# Patient Record
Sex: FEMALE | Race: WHITE | NOT HISPANIC OR LATINO | Employment: UNEMPLOYED | URBAN - METROPOLITAN AREA
[De-identification: names, ages, dates, MRNs, and addresses within clinical notes are randomized per-mention and may not be internally consistent; named-entity substitution may affect disease eponyms.]

---

## 2023-08-29 LAB
BASOPHILS (10*3/UL) IN BLOOD BY AUTOMATED COUNT: 0.05 X10E9/L (ref 0–0.1)
BASOPHILS/100 LEUKOCYTES IN BLOOD BY AUTOMATED COUNT: 0.5 % (ref 0–2)
EOSINOPHILS (10*3/UL) IN BLOOD BY AUTOMATED COUNT: 0.14 X10E9/L (ref 0–0.7)
EOSINOPHILS/100 LEUKOCYTES IN BLOOD BY AUTOMATED COUNT: 1.3 % (ref 0–6)
ERYTHROCYTE DISTRIBUTION WIDTH (RATIO) BY AUTOMATED COUNT: 15 % (ref 11.5–14.5)
ERYTHROCYTE MEAN CORPUSCULAR HEMOGLOBIN CONCENTRATION (G/DL) BY AUTOMATED: 32.1 G/DL (ref 32–36)
ERYTHROCYTE MEAN CORPUSCULAR VOLUME (FL) BY AUTOMATED COUNT: 83 FL (ref 80–100)
ERYTHROCYTES (10*6/UL) IN BLOOD BY AUTOMATED COUNT: 4.8 X10E12/L (ref 4–5.2)
HEMATOCRIT (%) IN BLOOD BY AUTOMATED COUNT: 39.9 % (ref 36–46)
HEMOGLOBIN (G/DL) IN BLOOD: 12.8 G/DL (ref 12–16)
IMMATURE GRANULOCYTES/100 LEUKOCYTES IN BLOOD BY AUTOMATED COUNT: 0.4 % (ref 0–0.9)
LEUKOCYTES (10*3/UL) IN BLOOD BY AUTOMATED COUNT: 10.9 X10E9/L (ref 4.4–11.3)
LYMPHOCYTES (10*3/UL) IN BLOOD BY AUTOMATED COUNT: 2.11 X10E9/L (ref 1.2–4.8)
LYMPHOCYTES/100 LEUKOCYTES IN BLOOD BY AUTOMATED COUNT: 19.3 % (ref 13–44)
MONOCYTES (10*3/UL) IN BLOOD BY AUTOMATED COUNT: 0.67 X10E9/L (ref 0.1–1)
MONOCYTES/100 LEUKOCYTES IN BLOOD BY AUTOMATED COUNT: 6.1 % (ref 2–10)
NEUTROPHILS (10*3/UL) IN BLOOD BY AUTOMATED COUNT: 7.92 X10E9/L (ref 1.2–7.7)
NEUTROPHILS/100 LEUKOCYTES IN BLOOD BY AUTOMATED COUNT: 72.4 % (ref 40–80)
PLATELETS (10*3/UL) IN BLOOD AUTOMATED COUNT: 276 X10E9/L (ref 150–450)

## 2023-10-02 ENCOUNTER — SPECIALTY PHARMACY (OUTPATIENT)
Dept: PHARMACY | Facility: CLINIC | Age: 63
End: 2023-10-02

## 2023-10-03 ENCOUNTER — PHARMACY VISIT (OUTPATIENT)
Dept: PHARMACY | Facility: CLINIC | Age: 63
End: 2023-10-03
Payer: MEDICAID

## 2023-10-03 PROCEDURE — RXMED WILLOW AMBULATORY MEDICATION CHARGE

## 2023-10-06 PROBLEM — R73.09 ELEVATED GLUCOSE LEVEL: Status: ACTIVE | Noted: 2023-10-06

## 2023-10-06 PROBLEM — I10 HYPERTENSION: Status: ACTIVE | Noted: 2023-10-06

## 2023-10-06 PROBLEM — E78.5 HYPERLIPIDEMIA, UNSPECIFIED: Status: ACTIVE | Noted: 2023-10-06

## 2023-10-06 PROBLEM — R42 VERTIGO: Status: ACTIVE | Noted: 2023-10-06

## 2023-10-06 PROBLEM — G93.89 MASS OF BRAIN: Status: ACTIVE | Noted: 2023-10-06

## 2023-10-06 PROBLEM — R04.0 EPISTAXIS: Status: ACTIVE | Noted: 2023-10-06

## 2023-10-06 PROBLEM — E11.9 DM TYPE 2 (DIABETES MELLITUS, TYPE 2) (MULTI): Status: ACTIVE | Noted: 2023-10-06

## 2023-10-06 PROBLEM — D32.9 MENINGIOMA (MULTI): Status: ACTIVE | Noted: 2023-10-06

## 2023-10-06 PROBLEM — M17.12 PRIMARY OSTEOARTHRITIS OF LEFT KNEE: Status: ACTIVE | Noted: 2023-10-06

## 2023-10-06 PROBLEM — S39.012A BACK STRAIN: Status: ACTIVE | Noted: 2023-10-06

## 2023-10-06 PROBLEM — D49.6 BRAIN TUMOR (MULTI): Status: ACTIVE | Noted: 2023-10-06

## 2023-10-06 PROBLEM — L03.211 CELLULITIS OF FACE: Status: ACTIVE | Noted: 2023-10-06

## 2023-10-06 PROBLEM — N32.81 OVERACTIVE BLADDER: Status: ACTIVE | Noted: 2023-10-06

## 2023-10-06 PROBLEM — M25.562 LEFT KNEE PAIN: Status: ACTIVE | Noted: 2023-10-06

## 2023-10-06 PROBLEM — D69.3 THROMBOCYTOPENIA, IDIOPATHIC (MULTI): Status: ACTIVE | Noted: 2023-10-06

## 2023-10-06 PROBLEM — R52 PAIN: Status: ACTIVE | Noted: 2023-10-06

## 2023-10-06 PROBLEM — E66.01 MORBID OBESITY (MULTI): Status: ACTIVE | Noted: 2023-10-06

## 2023-10-06 PROBLEM — M17.11 PRIMARY OSTEOARTHRITIS OF RIGHT KNEE: Status: ACTIVE | Noted: 2023-10-06

## 2023-10-06 PROBLEM — J32.9 SINUSITIS: Status: ACTIVE | Noted: 2023-10-06

## 2023-10-06 PROBLEM — M17.9 OA (OSTEOARTHRITIS) OF KNEE: Status: ACTIVE | Noted: 2023-10-06

## 2023-10-06 RX ORDER — TIZANIDINE 4 MG/1
1 TABLET ORAL 3 TIMES DAILY PRN
COMMUNITY
Start: 2022-10-19 | End: 2024-02-15 | Stop reason: ALTCHOICE

## 2023-10-06 RX ORDER — AMOXICILLIN 250 MG
CAPSULE ORAL
COMMUNITY
Start: 2021-04-29 | End: 2024-02-15 | Stop reason: ALTCHOICE

## 2023-10-06 RX ORDER — METFORMIN HYDROCHLORIDE 500 MG/1
1 TABLET ORAL 2 TIMES DAILY
COMMUNITY
Start: 2021-11-05 | End: 2023-10-10 | Stop reason: SDUPTHER

## 2023-10-06 RX ORDER — IBUPROFEN 200 MG
CAPSULE ORAL 2 TIMES DAILY
COMMUNITY
Start: 2021-11-12

## 2023-10-06 RX ORDER — INSULIN GLARGINE 100 [IU]/ML
52 INJECTION, SOLUTION SUBCUTANEOUS NIGHTLY
COMMUNITY
Start: 2021-12-10 | End: 2023-10-10 | Stop reason: SDUPTHER

## 2023-10-06 RX ORDER — METOPROLOL TARTRATE 50 MG/1
1 TABLET ORAL 2 TIMES DAILY
COMMUNITY
Start: 2020-06-29 | End: 2023-10-10 | Stop reason: SDUPTHER

## 2023-10-06 RX ORDER — MELOXICAM 15 MG/1
1 TABLET ORAL DAILY
COMMUNITY
Start: 2022-11-28 | End: 2023-10-10 | Stop reason: SDUPTHER

## 2023-10-06 RX ORDER — IBUPROFEN 600 MG/1
600 TABLET ORAL 3 TIMES DAILY PRN
COMMUNITY
Start: 2022-10-19 | End: 2023-10-30 | Stop reason: WASHOUT

## 2023-10-06 RX ORDER — PREDNISONE 5 MG/1
5 TABLET ORAL NIGHTLY
COMMUNITY
Start: 2021-10-28 | End: 2023-10-30 | Stop reason: ALTCHOICE

## 2023-10-06 RX ORDER — LOVASTATIN 40 MG/1
1 TABLET ORAL DAILY
COMMUNITY
Start: 2021-07-20 | End: 2023-10-10 | Stop reason: SDUPTHER

## 2023-10-10 ENCOUNTER — OFFICE VISIT (OUTPATIENT)
Dept: PRIMARY CARE | Facility: CLINIC | Age: 63
End: 2023-10-10
Payer: COMMERCIAL

## 2023-10-10 ENCOUNTER — LAB (OUTPATIENT)
Dept: LAB | Facility: LAB | Age: 63
End: 2023-10-10
Payer: COMMERCIAL

## 2023-10-10 VITALS
HEART RATE: 66 BPM | DIASTOLIC BLOOD PRESSURE: 80 MMHG | SYSTOLIC BLOOD PRESSURE: 132 MMHG | BODY MASS INDEX: 45.2 KG/M2 | WEIGHT: 288 LBS | HEIGHT: 67 IN | TEMPERATURE: 97.9 F | OXYGEN SATURATION: 96 %

## 2023-10-10 DIAGNOSIS — M17.12 PRIMARY OSTEOARTHRITIS OF LEFT KNEE: ICD-10-CM

## 2023-10-10 DIAGNOSIS — E78.5 HYPERLIPIDEMIA, UNSPECIFIED HYPERLIPIDEMIA TYPE: ICD-10-CM

## 2023-10-10 DIAGNOSIS — Z79.4 TYPE 2 DIABETES MELLITUS WITHOUT COMPLICATION, WITH LONG-TERM CURRENT USE OF INSULIN (MULTI): ICD-10-CM

## 2023-10-10 DIAGNOSIS — I10 HYPERTENSION, UNSPECIFIED TYPE: ICD-10-CM

## 2023-10-10 DIAGNOSIS — E11.9 TYPE 2 DIABETES MELLITUS WITHOUT COMPLICATION, WITH LONG-TERM CURRENT USE OF INSULIN (MULTI): ICD-10-CM

## 2023-10-10 LAB
ALBUMIN SERPL BCP-MCNC: 4.3 G/DL (ref 3.4–5)
ALP SERPL-CCNC: 97 U/L (ref 33–136)
ALT SERPL W P-5'-P-CCNC: 10 U/L (ref 7–45)
ANION GAP SERPL CALC-SCNC: 13 MMOL/L (ref 10–20)
AST SERPL W P-5'-P-CCNC: 11 U/L (ref 9–39)
BILIRUB SERPL-MCNC: 1 MG/DL (ref 0–1.2)
BUN SERPL-MCNC: 15 MG/DL (ref 6–23)
CALCIUM SERPL-MCNC: 9.3 MG/DL (ref 8.6–10.3)
CHLORIDE SERPL-SCNC: 103 MMOL/L (ref 98–107)
CHOLEST SERPL-MCNC: 138 MG/DL (ref 0–199)
CHOLESTEROL/HDL RATIO: 2.5
CO2 SERPL-SCNC: 29 MMOL/L (ref 21–32)
CREAT SERPL-MCNC: 0.74 MG/DL (ref 0.5–1.05)
GFR SERPL CREATININE-BSD FRML MDRD: 90 ML/MIN/1.73M*2
GLUCOSE SERPL-MCNC: 120 MG/DL (ref 74–99)
HDLC SERPL-MCNC: 54.8 MG/DL
LDLC SERPL CALC-MCNC: 59 MG/DL (ref 140–190)
NON HDL CHOLESTEROL: 83 MG/DL (ref 0–149)
POTASSIUM SERPL-SCNC: 4.6 MMOL/L (ref 3.5–5.3)
PROT SERPL-MCNC: 7.2 G/DL (ref 6.4–8.2)
SODIUM SERPL-SCNC: 140 MMOL/L (ref 136–145)
TRIGL SERPL-MCNC: 122 MG/DL (ref 0–149)
VLDL: 24 MG/DL (ref 0–40)

## 2023-10-10 PROCEDURE — 80061 LIPID PANEL: CPT

## 2023-10-10 PROCEDURE — 3044F HG A1C LEVEL LT 7.0%: CPT | Performed by: FAMILY MEDICINE

## 2023-10-10 PROCEDURE — 80053 COMPREHEN METABOLIC PANEL: CPT

## 2023-10-10 PROCEDURE — 3079F DIAST BP 80-89 MM HG: CPT | Performed by: FAMILY MEDICINE

## 2023-10-10 PROCEDURE — 36415 COLL VENOUS BLD VENIPUNCTURE: CPT

## 2023-10-10 PROCEDURE — 1036F TOBACCO NON-USER: CPT | Performed by: FAMILY MEDICINE

## 2023-10-10 PROCEDURE — 3048F LDL-C <100 MG/DL: CPT | Performed by: FAMILY MEDICINE

## 2023-10-10 PROCEDURE — 3075F SYST BP GE 130 - 139MM HG: CPT | Performed by: FAMILY MEDICINE

## 2023-10-10 PROCEDURE — 83036 HEMOGLOBIN GLYCOSYLATED A1C: CPT

## 2023-10-10 PROCEDURE — 99214 OFFICE O/P EST MOD 30 MIN: CPT | Performed by: FAMILY MEDICINE

## 2023-10-10 RX ORDER — LOVASTATIN 40 MG/1
40 TABLET ORAL DAILY
Qty: 90 TABLET | Refills: 0 | Status: SHIPPED | OUTPATIENT
Start: 2023-10-10 | End: 2024-02-15 | Stop reason: SDUPTHER

## 2023-10-10 RX ORDER — METOPROLOL TARTRATE 50 MG/1
50 TABLET ORAL 2 TIMES DAILY
Qty: 180 TABLET | Refills: 0 | Status: SHIPPED | OUTPATIENT
Start: 2023-10-10 | End: 2024-02-15 | Stop reason: SDUPTHER

## 2023-10-10 RX ORDER — INSULIN GLARGINE 100 [IU]/ML
52 INJECTION, SOLUTION SUBCUTANEOUS NIGHTLY
Qty: 15.6 ML | Refills: 2 | Status: SHIPPED | OUTPATIENT
Start: 2023-10-10 | End: 2024-02-15 | Stop reason: SDUPTHER

## 2023-10-10 RX ORDER — MELOXICAM 15 MG/1
15 TABLET ORAL DAILY
Qty: 90 TABLET | Refills: 0 | Status: SHIPPED | OUTPATIENT
Start: 2023-10-10 | End: 2023-10-30 | Stop reason: SDUPTHER

## 2023-10-10 RX ORDER — METFORMIN HYDROCHLORIDE 500 MG/1
500 TABLET ORAL 2 TIMES DAILY
Qty: 180 TABLET | Refills: 0 | Status: SHIPPED | OUTPATIENT
Start: 2023-10-10 | End: 2024-01-30

## 2023-10-10 NOTE — PROGRESS NOTES
"Subjective   Patient ID: Merle Dickerson is a 62 y.o. female who presents for Med Refill. Is fasting for labs.     HPI Pt is here for refills of medications to treat the following medical conditions. Unless otherwise stated, these conditions are well-controlled, pt is taking medications s Rx, and there are no reported side effects to medications or other treatment: DM2, Hyperlipidemia, Hypertension, Primary Osteoarthritis of left knee. Pt does check her sugars at home -- have been around 120-138 in the morning, rarely over 140. She goes between 42 and 44 units at bedtime. Has not had to go higher.     Review of Systems   All other systems reviewed and are negative.      Objective   /81   Pulse 66   Temp 36.6 °C (97.9 °F)   Ht 1.689 m (5' 6.5\")   Wt 131 kg (288 lb)   SpO2 96%   BMI 45.79 kg/m²     Physical Exam  Constitutional:       General: She is awake.      Appearance: Normal appearance. She is well-developed.   HENT:      Head: Normocephalic and atraumatic.   Cardiovascular:      Rate and Rhythm: Normal rate.   Pulmonary:      Effort: Pulmonary effort is normal.   Musculoskeletal:      Cervical back: Normal range of motion and neck supple.      Right lower leg: No edema.      Left lower leg: No edema.   Skin:     General: Skin is warm and dry.      Findings: No lesion or rash.   Neurological:      General: No focal deficit present.      Mental Status: She is alert and oriented to person, place, and time.      Cranial Nerves: No facial asymmetry.      Motor: Motor function is intact.      Gait: Gait is intact.   Psychiatric:         Attention and Perception: Attention normal.         Mood and Affect: Mood and affect normal.         Assessment/Plan   Problem List Items Addressed This Visit             ICD-10-CM       Cardiac and Vasculature    Hypertension I10    Relevant Medications    metoprolol tartrate (Lopressor) 50 mg tablet    Hyperlipidemia, unspecified E78.5    Relevant Medications    lovastatin " (Mevacor) 40 mg tablet    Other Relevant Orders    Lipid panel       Endocrine/Metabolic    DM type 2 (diabetes mellitus, type 2) (CMS/[facility]) E11.9    Relevant Medications    metFORMIN (Glucophage) 500 mg tablet    insulin glargine (Lantus Solostar U-100 Insulin) 100 unit/mL (3 mL) pen    Other Relevant Orders    Comprehensive metabolic panel    Hemoglobin A1c       Musculoskeletal and Injuries    Primary osteoarthritis of left knee M17.12    Relevant Medications    meloxicam (Mobic) 15 mg tablet        Chronic medications refilled without changes x 3 months. RTC at that time for refills.

## 2023-10-11 LAB
EST. AVERAGE GLUCOSE BLD GHB EST-MCNC: 157 MG/DL
HBA1C MFR BLD: 7.1 %

## 2023-10-30 ENCOUNTER — OFFICE VISIT (OUTPATIENT)
Dept: PRIMARY CARE | Facility: CLINIC | Age: 63
End: 2023-10-30
Payer: COMMERCIAL

## 2023-10-30 VITALS
BODY MASS INDEX: 45.99 KG/M2 | OXYGEN SATURATION: 95 % | HEART RATE: 83 BPM | HEIGHT: 67 IN | TEMPERATURE: 97.8 F | DIASTOLIC BLOOD PRESSURE: 80 MMHG | WEIGHT: 293 LBS | SYSTOLIC BLOOD PRESSURE: 132 MMHG

## 2023-10-30 DIAGNOSIS — M75.50 SUBSCAPULAR BURSITIS: Primary | ICD-10-CM

## 2023-10-30 DIAGNOSIS — M17.12 PRIMARY OSTEOARTHRITIS OF LEFT KNEE: ICD-10-CM

## 2023-10-30 PROCEDURE — 3075F SYST BP GE 130 - 139MM HG: CPT | Performed by: FAMILY MEDICINE

## 2023-10-30 PROCEDURE — 3079F DIAST BP 80-89 MM HG: CPT | Performed by: FAMILY MEDICINE

## 2023-10-30 PROCEDURE — 3051F HG A1C>EQUAL 7.0%<8.0%: CPT | Performed by: FAMILY MEDICINE

## 2023-10-30 PROCEDURE — 99214 OFFICE O/P EST MOD 30 MIN: CPT | Performed by: FAMILY MEDICINE

## 2023-10-30 PROCEDURE — 1036F TOBACCO NON-USER: CPT | Performed by: FAMILY MEDICINE

## 2023-10-30 PROCEDURE — 3048F LDL-C <100 MG/DL: CPT | Performed by: FAMILY MEDICINE

## 2023-10-30 RX ORDER — MELOXICAM 15 MG/1
15 TABLET ORAL DAILY
Qty: 90 TABLET | Refills: 0 | Status: SHIPPED | OUTPATIENT
Start: 2023-10-30 | End: 2024-01-28

## 2023-10-30 NOTE — PROGRESS NOTES
"Subjective   Patient ID: Merle Dickerson is a 62 y.o. female who presents for Back Pain (Below her right shoulder blade ).    HPI States this past Thursday she woke up stabbing pain below her right shoulder blade that's radiating into her buttocks. Has tried Tylenol with no relief in pain. Denies any injury but states she has been doing a lot of laundry.     Review of Systems   All other systems reviewed and are negative.      Objective   /80   Pulse 83   Temp 36.6 °C (97.8 °F)   Ht 1.689 m (5' 6.5\")   Wt 133 kg (293 lb 12.8 oz)   SpO2 95%   BMI 46.71 kg/m²     Physical Exam  Constitutional:       Appearance: Normal appearance.   HENT:      Mouth/Throat:      Mouth: Mucous membranes are moist.   Eyes:      Conjunctiva/sclera: Conjunctivae normal.   Cardiovascular:      Rate and Rhythm: Normal rate and regular rhythm.   Pulmonary:      Effort: Pulmonary effort is normal.      Breath sounds: Normal breath sounds.   Abdominal:      Palpations: Abdomen is soft.   Musculoskeletal:         General: Normal range of motion.      Comments: Localized pain over the right subscapular bursa. She is limited with right shoulder flexion and abduction.  normal.    Skin:     General: Skin is warm and dry.   Neurological:      Mental Status: She is alert and oriented to person, place, and time.   Psychiatric:         Mood and Affect: Mood normal.         Assessment/Plan Suspect subscapular bursitis based on Hx and exam. Demonstrated wall crawl stretches and additional shoulder exercises for home to maintain ROM. Ice 10-15 min per day 3-4 x per day. If there is not significant resolution of her discomfort in the next 2-3 weeks she'll RTC for additional evaluation. Discussed avoiding lifting weights with outstretched arms (e.g. laundry basket). Will use NSAID daily for 2-3 weeks only and then PRN         "

## 2023-11-06 ENCOUNTER — SPECIALTY PHARMACY (OUTPATIENT)
Dept: PHARMACY | Facility: CLINIC | Age: 63
End: 2023-11-06

## 2023-11-08 ENCOUNTER — PHARMACY VISIT (OUTPATIENT)
Dept: PHARMACY | Facility: CLINIC | Age: 63
End: 2023-11-08
Payer: MEDICAID

## 2023-11-08 ENCOUNTER — SPECIALTY PHARMACY (OUTPATIENT)
Dept: PHARMACY | Facility: CLINIC | Age: 63
End: 2023-11-08

## 2023-11-08 PROCEDURE — RXMED WILLOW AMBULATORY MEDICATION CHARGE

## 2023-11-09 ENCOUNTER — OFFICE VISIT (OUTPATIENT)
Dept: URGENT CARE | Facility: CLINIC | Age: 63
End: 2023-11-09
Payer: COMMERCIAL

## 2023-11-09 ENCOUNTER — ANCILLARY PROCEDURE (OUTPATIENT)
Dept: RADIOLOGY | Facility: CLINIC | Age: 63
End: 2023-11-09
Payer: COMMERCIAL

## 2023-11-09 VITALS
DIASTOLIC BLOOD PRESSURE: 118 MMHG | BODY MASS INDEX: 46.52 KG/M2 | TEMPERATURE: 98 F | HEART RATE: 73 BPM | SYSTOLIC BLOOD PRESSURE: 182 MMHG | WEIGHT: 292.6 LBS | OXYGEN SATURATION: 95 %

## 2023-11-09 DIAGNOSIS — M54.50 ACUTE RIGHT-SIDED LOW BACK PAIN WITHOUT SCIATICA: ICD-10-CM

## 2023-11-09 DIAGNOSIS — M54.50 ACUTE RIGHT-SIDED LOW BACK PAIN WITHOUT SCIATICA: Primary | ICD-10-CM

## 2023-11-09 PROCEDURE — 99213 OFFICE O/P EST LOW 20 MIN: CPT | Performed by: NURSE PRACTITIONER

## 2023-11-09 PROCEDURE — 71101 X-RAY EXAM UNILAT RIBS/CHEST: CPT | Mod: RT,FR

## 2023-11-09 PROCEDURE — 1036F TOBACCO NON-USER: CPT | Performed by: NURSE PRACTITIONER

## 2023-11-09 PROCEDURE — 3048F LDL-C <100 MG/DL: CPT | Performed by: NURSE PRACTITIONER

## 2023-11-09 PROCEDURE — 3077F SYST BP >= 140 MM HG: CPT | Performed by: NURSE PRACTITIONER

## 2023-11-09 PROCEDURE — 71101 X-RAY EXAM UNILAT RIBS/CHEST: CPT | Mod: RIGHT SIDE | Performed by: RADIOLOGY

## 2023-11-09 PROCEDURE — 3051F HG A1C>EQUAL 7.0%<8.0%: CPT | Performed by: NURSE PRACTITIONER

## 2023-11-09 PROCEDURE — 3080F DIAST BP >= 90 MM HG: CPT | Performed by: NURSE PRACTITIONER

## 2023-11-09 RX ORDER — TIZANIDINE 4 MG/1
4 TABLET ORAL 3 TIMES DAILY
Qty: 30 TABLET | Refills: 0 | Status: SHIPPED
Start: 2023-11-09 | End: 2024-02-15 | Stop reason: ALTCHOICE

## 2023-11-09 NOTE — PROGRESS NOTES
Subjective   Patient ID: Merle Dickerson is a 62 y.o. female.    Patient presents with sharp L shoulder pain, worsening x 3 weeks, 10/10 pain. Worse with movement.  Patient states that she does see primary care here and thought it was may be a rotator cuff issue put her on meloxicam with some exercises and ice she states has been doing that religiously with no relief.  Pain is to the mid right back she rates it a 10 out of 10 at times sharp in nature denies any rash, lesion, fever, chills, chest pain, shortness of breath, injury.  She states just prior to this she was doing laundry and carrying bags but not a laundry hamper and states that she really does not think that that caused this pain.      Shoulder Pain      The following portions of the chart were reviewed this encounter and updated as appropriate:         Review of Systems   All other systems reviewed and are negative.    Objective   Physical Exam  Vitals and nursing note reviewed.   Constitutional:       General: She is not in acute distress.     Appearance: Normal appearance. She is not toxic-appearing.   HENT:      Head: Normocephalic.      Right Ear: External ear normal.      Left Ear: External ear normal.      Nose: Nose normal.      Mouth/Throat:      Mouth: Mucous membranes are moist.      Pharynx: No oropharyngeal exudate or posterior oropharyngeal erythema.   Eyes:      Extraocular Movements: Extraocular movements intact.   Cardiovascular:      Rate and Rhythm: Normal rate and regular rhythm.      Heart sounds: Normal heart sounds.   Pulmonary:      Effort: Pulmonary effort is normal.      Breath sounds: Normal breath sounds. No wheezing.   Musculoskeletal:         General: Normal range of motion.        Arms:       Cervical back: Normal range of motion and neck supple.      Comments: Pain to the mid right back, no skin rash or lesion, no crepitus or deformity.  No bony tenderness, patient has pain with only light palpation.  Pain does not radiate.   She has full range of motion   Skin:     Capillary Refill: Capillary refill takes less than 2 seconds.   Neurological:      General: No focal deficit present.      Mental Status: She is alert and oriented to person, place, and time.   Psychiatric:         Mood and Affect: Mood normal.         Behavior: Behavior normal.         Thought Content: Thought content normal.       Procedures    Assessment/Plan   Diagnoses and all orders for this visit:  Acute right-sided low back pain without sciatica  -     tiZANidine (Zanaflex) 4 mg tablet; Take 1 tablet (4 mg) by mouth 3 times a day.  -     XR ribs right 2 views w chest anteroposterior; Future  We will try muscle relaxers and do an x-ray at this time due to repeat visit to rule out any possible bony abnormalities or masses or lesions.  Patient agrees    Above plan of care was reviewed with the patient, all questions were answered, through shared decision making the patient agrees with this plan of care.    Red flags for strict return precaution have been reviewed with the patient and or patient gajessiedian, patient is alert, oriented and non-toxic appearing, has decision making capabilities and agrees with the plan of care through shared decision making at this time. Current diagnosis, any medication changes, lab or radiologic results have been reviewed with the patient at the time of visit. If symptoms do not improve, or worsen, patient is to follow up with PCP or report to the emergency room.   Patient is alert and oriented x3 vital signs stable nontoxic-appearing and has decision-making capabilities.    Please note that the majority this note was made with Dragon speaking software there may be grammatical errors secondary to the speaking program.

## 2023-11-13 ENCOUNTER — OFFICE VISIT (OUTPATIENT)
Dept: PRIMARY CARE | Facility: CLINIC | Age: 63
End: 2023-11-13
Payer: COMMERCIAL

## 2023-11-13 ENCOUNTER — TELEPHONE (OUTPATIENT)
Dept: PRIMARY CARE | Facility: CLINIC | Age: 63
End: 2023-11-13

## 2023-11-13 VITALS
BODY MASS INDEX: 45.99 KG/M2 | SYSTOLIC BLOOD PRESSURE: 127 MMHG | OXYGEN SATURATION: 96 % | HEIGHT: 67 IN | TEMPERATURE: 97.8 F | DIASTOLIC BLOOD PRESSURE: 85 MMHG | HEART RATE: 75 BPM | WEIGHT: 293 LBS

## 2023-11-13 DIAGNOSIS — M75.50 SUBSCAPULAR BURSITIS: ICD-10-CM

## 2023-11-13 PROCEDURE — 3079F DIAST BP 80-89 MM HG: CPT | Performed by: FAMILY MEDICINE

## 2023-11-13 PROCEDURE — 1036F TOBACCO NON-USER: CPT | Performed by: FAMILY MEDICINE

## 2023-11-13 PROCEDURE — 3051F HG A1C>EQUAL 7.0%<8.0%: CPT | Performed by: FAMILY MEDICINE

## 2023-11-13 PROCEDURE — 3048F LDL-C <100 MG/DL: CPT | Performed by: FAMILY MEDICINE

## 2023-11-13 PROCEDURE — 3074F SYST BP LT 130 MM HG: CPT | Performed by: FAMILY MEDICINE

## 2023-11-13 PROCEDURE — 99214 OFFICE O/P EST MOD 30 MIN: CPT | Performed by: FAMILY MEDICINE

## 2023-11-13 RX ORDER — TRIAMCINOLONE ACETONIDE 40 MG/ML
40 INJECTION, SUSPENSION INTRA-ARTICULAR; INTRAMUSCULAR ONCE
Status: SHIPPED | OUTPATIENT
Start: 2023-11-13

## 2023-11-13 NOTE — PROGRESS NOTES
"Subjective   Patient ID: Merle Dickerson is a 62 y.o. female who presents for Follow-up (Right shoulder pain, review of x-ray results. ).    HPI C/o stabbing pain below her right shoulder blade with difficulty raising her arm. Patient was treated at  Urgent Care on 11/09/2023. Rx Tizanidine and order for X-ray. Patient is taking Mobic. Tizanidine makes her tired and wakes up with continued pain. Is requesting to go over previous x-ray results.     Review of Systems   All other systems reviewed and are negative.      Objective   /85   Pulse 75   Temp 36.6 °C (97.8 °F)   Ht 1.689 m (5' 6.5\")   Wt 135 kg (298 lb)   LMP  (LMP Unknown)   SpO2 96%   BMI 47.38 kg/m²     Physical Exam  Constitutional:       Appearance: Normal appearance.   HENT:      Mouth/Throat:      Mouth: Mucous membranes are moist.   Eyes:      Conjunctiva/sclera: Conjunctivae normal.   Cardiovascular:      Rate and Rhythm: Normal rate and regular rhythm.   Pulmonary:      Effort: Pulmonary effort is normal.      Breath sounds: Normal breath sounds.   Abdominal:      Palpations: Abdomen is soft.   Musculoskeletal:         General: Normal range of motion.      Comments: TTP over the right subscapular bursa.   Skin:     General: Skin is warm and dry.   Neurological:      Mental Status: She is alert and oriented to person, place, and time.   Psychiatric:         Mood and Affect: Mood normal.         Assessment/Plan Reviewed ER visit with pt. She did have what sounds like labyrinthitis, however, there was resolution after 4 days. States pain continue with the shoulder. No improvement with ice or NSAID. Will give IM 40 mg Kenalog right deltoid x 1.  RTC PRN         "

## 2023-11-16 DIAGNOSIS — M75.50 SUBSCAPULAR BURSITIS: ICD-10-CM

## 2023-11-16 NOTE — PROGRESS NOTES
Spoke with Dr. Singleton, order for Physical Therapy placed in her chart. Patient may call  scheduling [phone]. I left message to inform the patient

## 2023-11-17 ENCOUNTER — EVALUATION (OUTPATIENT)
Dept: PHYSICAL THERAPY | Facility: HOSPITAL | Age: 63
End: 2023-11-17
Payer: COMMERCIAL

## 2023-11-17 DIAGNOSIS — M75.50 SUBSCAPULAR BURSITIS: Primary | ICD-10-CM

## 2023-11-17 DIAGNOSIS — M79.18 RHOMBOID PAIN: ICD-10-CM

## 2023-11-17 PROCEDURE — 97162 PT EVAL MOD COMPLEX 30 MIN: CPT | Mod: GP

## 2023-11-17 ASSESSMENT — PATIENT HEALTH QUESTIONNAIRE - PHQ9
2. FEELING DOWN, DEPRESSED OR HOPELESS: NEARLY EVERY DAY
1. LITTLE INTEREST OR PLEASURE IN DOING THINGS: NEARLY EVERY DAY
SUM OF ALL RESPONSES TO PHQ9 QUESTIONS 1 AND 2: 6

## 2023-11-17 ASSESSMENT — ENCOUNTER SYMPTOMS
DEPRESSION: 1
LOSS OF SENSATION IN FEET: 0
OCCASIONAL FEELINGS OF UNSTEADINESS: 0

## 2023-11-17 ASSESSMENT — PAIN - FUNCTIONAL ASSESSMENT: PAIN_FUNCTIONAL_ASSESSMENT: 0-10

## 2023-11-17 NOTE — PROGRESS NOTES
Physical Therapy    Physical Therapy Evaluation and Treatment      Patient Name: Merle Dickerson  MRN: [id number]  : 1960   Today's Date: 2023  Time Calculation  Start Time: 1300  Stop Time: 1403 (Paperwork not complete)  Time Calculation (min): 63 min        PT Assessment Results: Decreased strength, Decreased range of motion, Pain  Rehab Prognosis: Good      Current Problem:   1. Subscapular bursitis  Referral to Physical Therapy      2. Rhomboid pain            Subjective    General:  General  Reason for Referral: Right shoulder-Back Pain  Referred By: Areli  Past Medical History Relevant to Rehab: Brain tumor, craniotomy  Preferred Learning Style: kinesthetic, visual  Shoulder pain woke her up in the middle of the night with right shoulder pain, felt like someone was stabbing her between the shoulder blades. The only thing she had done differently was washing and hanging her sweaters for winter.  Was in severe pain until she heard a pop, felt good for 3 days and then it returned. Pt denies radicular pain or N/T    Precautions:  Precautions  STEADI Fall Risk Score (The score of 4 or more indicates an increased risk of falling): 3       Pain:  Pain Assessment  Pain Assessment: 0-10  Pain Type: Chronic pain  Pain Location: Shoulder (Scapula)  Pain Orientation: Right  Pain Frequency: Other (Comment)  Best: 0/10  Current: 2/10  Worst: 10/10       Prior Level of Function:     Independent with ADL's    Objective     Sensation:    Intact and symmetrical to light touch in LE's    Strength:  Unable to position for strength testing of Rhomboid   Right    Shoulder  Comment   Flexion 4-/5 In AROM   Extension 4-/5 Pain   IR 4+/5    ER 4/5         Elbow      Flexion WNL    Extension WNL                        ROM:   Right Left   Shoulder     Flexion 67 134   Extension     IR@0 60 60   ER@0 28 40        Elbow      Flexion WNL WNL   Extension WNL WNL        Cervical         Flex 32    Ext 33    SB 20            25    Rotation 67            50      Palpation:  Pain with palpation over vertebral border of scapula at approx T4- 5,  TTP over right A/C joint, and lateral GH jt line,      Special Testing:   Scarf Test: pain in scap region  Willett Chase - Pos  Outcome Measures:  Other Measures  Disability of Arm Shoulder Hand (DASH): 57%     Treatments:                                            Date: 11/17                                              VISIT# #1 # # #    EXERCISES REPS REPS REPS REPS            Pulley's:  Flex                        Scap                                                                                                                                                                                                        Manual        Dry Needle(?)                HEP         Assessment:  PT Assessment  PT Assessment Results: Decreased strength, Decreased range of motion, Pain  Rehab Prognosis: Good    Plan:  OP PT Plan  Treatment/Interventions: Dry needling, Cryotherapy, Education/ Instruction  PT Plan: Skilled PT  PT Frequency: 2 times per week  Duration: 4-6 wks  Onset Date: 10/26/23  Rehab Potential: Good  Plan of Care Agreement: Patient     Goals:  Active       Scapular Pain       PT Goal 1       Start:  11/17/23    Expected End:  12/04/23       Pt will demonstrate independence with HEP to reinforce gains made in treatment          PT Goal 2       Start:  11/17/23    Expected End:  12/04/23       Pt will report having a decrease in pain to 8/10 at its worst for increased tolerance for ROM exercises          PT Goal 3       Start:  11/17/23    Expected End:  12/18/23       Pt will have an increase in right shoulder ROM to WFL's for performing reaching and self care activities         PT Goal 4       Start:  11/20/23    Expected End:  12/18/23       Pt will have an increase in right shoulder strength by 1/3 MMT grade for lifting and reaching

## 2023-12-04 ENCOUNTER — LAB (OUTPATIENT)
Dept: LAB | Facility: LAB | Age: 63
End: 2023-12-04
Payer: MEDICARE

## 2023-12-04 ENCOUNTER — TREATMENT (OUTPATIENT)
Dept: PHYSICAL THERAPY | Facility: HOSPITAL | Age: 63
End: 2023-12-04
Payer: MEDICARE

## 2023-12-04 ENCOUNTER — APPOINTMENT (OUTPATIENT)
Dept: LAB | Facility: HOSPITAL | Age: 63
End: 2023-12-04
Payer: MEDICARE

## 2023-12-04 ENCOUNTER — SPECIALTY PHARMACY (OUTPATIENT)
Dept: PHARMACY | Facility: CLINIC | Age: 63
End: 2023-12-04

## 2023-12-04 DIAGNOSIS — M79.18 RHOMBOID PAIN: ICD-10-CM

## 2023-12-04 DIAGNOSIS — M75.50 SUBSCAPULAR BURSITIS: Primary | ICD-10-CM

## 2023-12-04 DIAGNOSIS — D69.3 IMMUNE THROMBOCYTOPENIC PURPURA (MULTI): Primary | ICD-10-CM

## 2023-12-04 LAB
BASOPHILS # BLD AUTO: 0.03 X10*3/UL (ref 0–0.1)
BASOPHILS NFR BLD AUTO: 0.2 %
EOSINOPHIL # BLD AUTO: 0.12 X10*3/UL (ref 0–0.7)
EOSINOPHIL NFR BLD AUTO: 1 %
ERYTHROCYTE [DISTWIDTH] IN BLOOD BY AUTOMATED COUNT: 15.1 % (ref 11.5–14.5)
HCT VFR BLD AUTO: 43.4 % (ref 36–46)
HGB BLD-MCNC: 13.2 G/DL (ref 12–16)
IMM GRANULOCYTES # BLD AUTO: 0.05 X10*3/UL (ref 0–0.7)
IMM GRANULOCYTES NFR BLD AUTO: 0.4 % (ref 0–0.9)
LYMPHOCYTES # BLD AUTO: 1.89 X10*3/UL (ref 1.2–4.8)
LYMPHOCYTES NFR BLD AUTO: 15.5 %
MCH RBC QN AUTO: 26.4 PG (ref 26–34)
MCHC RBC AUTO-ENTMCNC: 30.4 G/DL (ref 32–36)
MCV RBC AUTO: 87 FL (ref 80–100)
MONOCYTES # BLD AUTO: 0.9 X10*3/UL (ref 0.1–1)
MONOCYTES NFR BLD AUTO: 7.4 %
NEUTROPHILS # BLD AUTO: 9.2 X10*3/UL (ref 1.2–7.7)
NEUTROPHILS NFR BLD AUTO: 75.5 %
NRBC BLD-RTO: 0 /100 WBCS (ref 0–0)
PLATELET # BLD AUTO: 304 X10*3/UL (ref 150–450)
RBC # BLD AUTO: 5 X10*6/UL (ref 4–5.2)
WBC # BLD AUTO: 12.2 X10*3/UL (ref 4.4–11.3)

## 2023-12-04 PROCEDURE — 97110 THERAPEUTIC EXERCISES: CPT | Mod: GP,CQ | Performed by: PHYSICAL THERAPY ASSISTANT

## 2023-12-04 PROCEDURE — 36415 COLL VENOUS BLD VENIPUNCTURE: CPT

## 2023-12-04 PROCEDURE — 85025 COMPLETE CBC W/AUTO DIFF WBC: CPT

## 2023-12-04 ASSESSMENT — PAIN - FUNCTIONAL ASSESSMENT: PAIN_FUNCTIONAL_ASSESSMENT: 0-10

## 2023-12-04 ASSESSMENT — PAIN SCALES - GENERAL: PAINLEVEL_OUTOF10: 2

## 2023-12-04 NOTE — PROGRESS NOTES
"Physical Therapy    Physical Therapy Treatment    Patient Name: Merle Dickerson  MRN: [id number]  Today's Date: 12/4/2023  Time Calculation  Start Time: 1345  Stop Time: 1430  Time Calculation (min): 45 min      Assessment:   Pt reports no change in pain post session, ER activity increased pain. Pt requires verbal instruct for technique and attention to task.  Plan:   Assess response to initial session and progress strengthening program accordingly.    Current Problem  1. Subscapular bursitis  Follow Up In Physical Therapy      2. Rhomboid pain  Follow Up In Physical Therapy          General   Pt reports her shoulder is getting better, states heat helps. Reports she \"can't wear bra as it feels like it is cutting into her rib cage. \" No longer taking pain meds.       Subjective    Precautions  Precautions  STEADI Fall Risk Score (The score of 4 or more indicates an increased risk of falling): 3    Pain  Pain Assessment  Pain Assessment: 0-10  Pain Score: 2  Pain Type: Chronic pain  Pain Location: Shoulder (scapula)  Pain Orientation: Right    Objective     Treatments:                                            Date: 11/17/23 12/4/23                                             VISIT# #1 #2 # #    EXERCISES REPS REPS REPS REPS            Pulley's:  Flex  3 min                      Scap  3 min              Tband: Lat pulls  YTB 2 x 10      Mid rows  YTB 2 x 10      IR  YTB 2 x 10      ER  INC pain              Ball on wall flex  10\" x 10              Serratus Punch  2 x 10              Wand flex  5\" H x 10                              Doorway Stretch  20\" x 5                                                                              Manual        Dry Needle(?)                HEP             OP EDUCATION:       Goals:  Active       Scapular Pain       PT Goal 1       Start:  11/17/23    Expected End:  12/04/23       Pt will demonstrate independence with HEP to reinforce gains made in treatment          PT Goal 2       " Start:  11/17/23    Expected End:  12/04/23       Pt will report having a decrease in pain to 8/10 at its worst for increased tolerance for ROM exercises          PT Goal 3       Start:  11/17/23    Expected End:  12/18/23       Pt will have an increase in right shoulder ROM to WFL's for performing reaching and self care activities         PT Goal 4       Start:  11/20/23    Expected End:  12/18/23       Pt will have an increase in right shoulder strength by 1/3 MMT grade for lifting and reaching

## 2023-12-06 ENCOUNTER — TELEPHONE (OUTPATIENT)
Dept: PRIMARY CARE | Facility: CLINIC | Age: 63
End: 2023-12-06

## 2023-12-06 ENCOUNTER — TREATMENT (OUTPATIENT)
Dept: PHYSICAL THERAPY | Facility: HOSPITAL | Age: 63
End: 2023-12-06
Payer: MEDICARE

## 2023-12-06 DIAGNOSIS — M79.18 RHOMBOID PAIN: ICD-10-CM

## 2023-12-06 DIAGNOSIS — M75.50 SUBSCAPULAR BURSITIS: ICD-10-CM

## 2023-12-06 PROCEDURE — 97110 THERAPEUTIC EXERCISES: CPT | Mod: GP,CQ | Performed by: PHYSICAL THERAPY ASSISTANT

## 2023-12-06 ASSESSMENT — PAIN SCALES - GENERAL: PAINLEVEL_OUTOF10: 1

## 2023-12-06 ASSESSMENT — PAIN - FUNCTIONAL ASSESSMENT: PAIN_FUNCTIONAL_ASSESSMENT: 0-10

## 2023-12-06 NOTE — PROGRESS NOTES
"Physical Therapy    Physical Therapy Treatment    Patient Name: Merle Dickerson  MRN: [id number]  Today's Date: 12/6/2023  Time Calculation  Start Time: 1345  Stop Time: 1430  Time Calculation (min): 45 min      Assessment:   Pt had no adverse effects with current session. Pt requires consistent verbal instruct for technique and attention to task.  Plan:   Progress strengthening program accordingly.    Current Problem  1. Subscapular bursitis  Follow Up In Physical Therapy      2. Rhomboid pain  Follow Up In Physical Therapy          General   Pt reports her shoulder was sore yesterday from previous session. Reports no pain today, \"aware that it's there\"   Subjective    Precautions  Precautions  STEADI Fall Risk Score (The score of 4 or more indicates an increased risk of falling): 3    Pain  Pain Assessment  Pain Assessment: 0-10  Pain Score: 1  Pain Type: Chronic pain  Pain Location: Shoulder  Pain Orientation: Right    Objective   AROM shldr flex   Treatments:                                            Date: 11/17/23 12/4/23 12/6/23                                            VISIT# #1 #2 #3 #    EXERCISES REPS REPS REPS REPS            Pulley's:  Flex  3 min 3 min                     Scap  3 min 3 min             Tband: Lat pulls  YTB 2 x 10 YTB 2 x 10     Mid rows  YTB 2 x 10 YTB 2 x 10     IR  YTB 2 x 10 YTB 2 x 10     ER  INC pain Held             Ball on wall flex  10\" x 10 10\" x 10             Serratus Punch  2 x 10 2 x 10             Wand flex  5\" H x 10 5\" H x 10                             Doorway Stretch  20\" x 5 20\" x 5                                                                             Manual        Dry Needle(?)                HEP             OP EDUCATION:       Goals:  Active       Scapular Pain       PT Goal 1       Start:  11/17/23    Expected End:  12/04/23       Pt will demonstrate independence with HEP to reinforce gains made in treatment          PT Goal 2       Start:  11/17/23    Expected " End:  12/04/23       Pt will report having a decrease in pain to 8/10 at its worst for increased tolerance for ROM exercises          PT Goal 3       Start:  11/17/23    Expected End:  12/18/23       Pt will have an increase in right shoulder ROM to WFL's for performing reaching and self care activities         PT Goal 4       Start:  11/20/23    Expected End:  12/18/23       Pt will have an increase in right shoulder strength by 1/3 MMT grade for lifting and reaching

## 2023-12-11 ENCOUNTER — SOCIAL WORK (OUTPATIENT)
Dept: CASE MANAGEMENT | Facility: HOSPITAL | Age: 63
End: 2023-12-11

## 2023-12-11 ENCOUNTER — TREATMENT (OUTPATIENT)
Dept: PHYSICAL THERAPY | Facility: HOSPITAL | Age: 63
End: 2023-12-11
Payer: MEDICARE

## 2023-12-11 DIAGNOSIS — M79.18 RHOMBOID PAIN: ICD-10-CM

## 2023-12-11 DIAGNOSIS — M75.50 SUBSCAPULAR BURSITIS: ICD-10-CM

## 2023-12-11 PROCEDURE — RXMED WILLOW AMBULATORY MEDICATION CHARGE

## 2023-12-11 PROCEDURE — 97110 THERAPEUTIC EXERCISES: CPT | Mod: GP,CQ | Performed by: PHYSICAL THERAPY ASSISTANT

## 2023-12-11 ASSESSMENT — PAIN - FUNCTIONAL ASSESSMENT: PAIN_FUNCTIONAL_ASSESSMENT: 0-10

## 2023-12-11 ASSESSMENT — PAIN SCALES - GENERAL: PAINLEVEL_OUTOF10: 0 - NO PAIN

## 2023-12-11 NOTE — PROGRESS NOTES
"Physical Therapy    Physical Therapy Treatment    Patient Name: Merle Dickerson  MRN: [id number]  Today's Date: 12/11/2023  Time Calculation  Start Time: 1345  Stop Time: 1430  Time Calculation (min): 45 min      Assessment:   Pt issued Tband HEP. Improved AROM.  Reported soreness inferior medial border of R scapula with doorway stretch. Pt requires consistent verbal instruct for technique and attention to task.  Plan:  Add scap stabilization ex.    Progress strengthening program accordingly.    Current Problem  1. Subscapular bursitis  Follow Up In Physical Therapy      2. Rhomboid pain  Follow Up In Physical Therapy          General   Pt reports her shoulder was sore on Friday due to prob overdoing HEP per pt. Reports no pain today.  Precautions  Precautions  STEADI Fall Risk Score (The score of 4 or more indicates an increased risk of falling): 3    Pain  Pain Assessment  Pain Assessment: 0-10  Pain Score: 0 - No pain  Pain Type: Chronic pain  Pain Location: Shoulder  Pain Orientation: Right    Objective   AROM standing  shldr flex 138 degrees  Treatments:                                            Date: 11/17/23 12/4/23 12/6/23 12/11/23                                           VISIT# #1 #2 #3 #4    EXERCISES REPS REPS REPS REPS            Pulley's:  Flex  3 min 3 min 3 min                    Scap  3 min 3 min 3 min            Tband: Lat pulls  YTB 2 x 10 YTB 2 x 10 RTB 2 x 10    Mid rows  YTB 2 x 10 YTB 2 x 10 RTB 2 x 10    IR  YTB 2 x 10 YTB 2 x 10 RTB 2 x 10    ER  INC pain Held YTB 2 x 10            Ball on wall flex  10\" x 10 10\" x 10 10 x 10\"            Serratus Punch  2 x 10 2 x 10 2 x 10 with 1#            Wand flex  5\" H x 10 5\" H x 10 5\" x 10                            Doorway Stretch  20\" x 5 20\" x 5 20\" x 5                                                                            Manual        Dry Needle(?)                HEP             OP EDUCATION:   " [email or web address]    Goals:  Active       Scapular Pain       PT Goal 1       Start:  11/17/23    Expected End:  12/04/23       Pt will demonstrate independence with HEP to reinforce gains made in treatment          PT Goal 2       Start:  11/17/23    Expected End:  12/04/23       Pt will report having a decrease in pain to 8/10 at its worst for increased tolerance for ROM exercises          PT Goal 3       Start:  11/17/23    Expected End:  12/18/23       Pt will have an increase in right shoulder ROM to WFL's for performing reaching and self care activities         PT Goal 4       Start:  11/20/23    Expected End:  12/18/23       Pt will have an increase in right shoulder strength by 1/3 MMT grade for lifting and reaching

## 2023-12-11 NOTE — PROGRESS NOTES
ZINA was asked to assist with patient obtaining Promacta. Patient has been on Promacta for this year. Patient's insurance recently changed to Medicare from [place] Medicaid. SW reached out to the [employer] Program with medicare part D. Patient should have been automatically enrolled due to changing to Medicare from Medicaid. Per customer service, patient was not enrolled; enrollment initiated; documents faxed to the [employer] Program. This process could take a few days. ZINA requested an expedited review. ZINA conducted a conference call with  [facility] and [facility] in order to perform an override of the denied claim. Pharmacy staff able to get a paid claim for patient; medication to be sent to patient today. [facility] team updated. SW available as needed.   ADDENDUM: December 19, 2023 SW reviewed paperwork that patient received from the [facility] office. Patient has a pending [facility] Medicaid application. The paperwork from [facility] was to advise patient of this referral and to be compliant with enrollment process. It is unclear if patient has a qualifying diagnosis for the SRS Program. ZINA also learned patient is not eligible for the [employer] program with medicare part D. (Over-income). Patient to meet with an  (of her choosing) to review options and plans. ZINA also explained the financial aid program with [facility], which patient may qualify. This will help with co-pays and deductibles. Support offered. ZINA to follow as needed.

## 2023-12-12 ENCOUNTER — PHARMACY VISIT (OUTPATIENT)
Dept: PHARMACY | Facility: CLINIC | Age: 63
End: 2023-12-12
Payer: COMMERCIAL

## 2023-12-13 ENCOUNTER — TREATMENT (OUTPATIENT)
Dept: PHYSICAL THERAPY | Facility: HOSPITAL | Age: 63
End: 2023-12-13
Payer: MEDICARE

## 2023-12-13 DIAGNOSIS — M79.18 RHOMBOID PAIN: ICD-10-CM

## 2023-12-13 DIAGNOSIS — M75.50 SUBSCAPULAR BURSITIS: ICD-10-CM

## 2023-12-13 PROCEDURE — 97110 THERAPEUTIC EXERCISES: CPT | Mod: GP

## 2023-12-13 NOTE — PROGRESS NOTES
"Physical Therapy    Physical Therapy Treatment    Patient Name: Merle Dickerson  MRN: [id number]  : 1960   Today's Date: 2023    Time Calculation  Start Time: 1347  Stop Time: 1428  Time Calculation (min): 41 min  Visit #5            Current Problem  1. Subscapular bursitis  Follow Up In Physical Therapy      2. Rhomboid pain  Follow Up In Physical Therapy            Subjective   Pt reports that her shoulder is feeling a lot better, so happy to be able to sleep through the night now.       Precautions  Precautions  STEADI Fall Risk Score (The score of 4 or more indicates an increased risk of falling): 3       Pain  Pain Assessment: 0-10  Pain Type: Chronic pain  Pain Location: Shoulder  Pain Orientation: Left  Current : 1.5/10  Worst: 2/10     Objective       Right Left   Shoulder     Flexion 144 134   Extension     IR@0 60 60   ER@0 65 40       Outcome Measures:   DASH : 2%    Treatments:                                         Date: 23                                          VISIT# #1 #2 #3 #4 5   EXERCISES REPS REPS REPS REPS            Pulley's:  Flex  3 min 3 min 3 min 3'                   Scap  3 min 3 min 3 min 3'           Tband: Lat pulls  YTB 2 x 10 YTB 2 x 10 RTB 2 x 10    Mid rows  YTB 2 x 10 YTB 2 x 10 RTB 2 x 10    IR  YTB 2 x 10 YTB 2 x 10 RTB 2 x 10    ER  INC pain Held YTB 2 x 10            Ball on wall flex  10\" x 10 10\" x 10 10 x 10\"            Serratus Punch  2 x 10 2 x 10 2 x 10 with 1#            Wand flex  5\" H x 10 5\" H x 10 5\" x 10            Ball circles on the wall     2 x 10           Doorway Stretch  20\" x 5 20\" x 5 20\" x 5 30\"x 3                                                                           Manual        Dry Needle(?)                HEP      X     Assessment:  Pt has responded to treatment with decrease in pain and increase in ROM.       Plan:   Prep for D/C         Goals:  Resolved       Scapular Pain       PT Goal 1 (Met)  "      Start:  11/17/23    Expected End:  12/04/23    Resolved:  12/18/23    Pt will demonstrate independence with HEP to reinforce gains made in treatment          PT Goal 2 (Met)       Start:  11/17/23    Expected End:  12/04/23    Resolved:  12/18/23    Pt will report having a decrease in pain to 8/10 at its worst for increased tolerance for ROM exercises          PT Goal 3 (Met)       Start:  11/17/23    Expected End:  12/18/23    Resolved:  12/18/23    Pt will have an increase in right shoulder ROM to WFL's for performing reaching and self care activities         PT Goal 4 (Met)       Start:  11/20/23    Expected End:  12/18/23    Resolved:  12/18/23    Pt will have an increase in right shoulder strength by 1/3 MMT grade for lifting and reaching               .

## 2023-12-14 PROBLEM — M75.50 SUBSCAPULAR BURSITIS: Status: ACTIVE | Noted: 2023-12-14

## 2023-12-14 PROBLEM — M79.18 RHOMBOID PAIN: Status: ACTIVE | Noted: 2023-12-14

## 2023-12-14 ASSESSMENT — PAIN - FUNCTIONAL ASSESSMENT: PAIN_FUNCTIONAL_ASSESSMENT: 0-10

## 2023-12-18 ENCOUNTER — TREATMENT (OUTPATIENT)
Dept: PHYSICAL THERAPY | Facility: HOSPITAL | Age: 63
End: 2023-12-18
Payer: MEDICARE

## 2023-12-18 DIAGNOSIS — M79.18 RHOMBOID PAIN: ICD-10-CM

## 2023-12-18 DIAGNOSIS — M75.50 SUBSCAPULAR BURSITIS: ICD-10-CM

## 2023-12-18 PROCEDURE — 97110 THERAPEUTIC EXERCISES: CPT | Mod: GP

## 2023-12-18 NOTE — PROGRESS NOTES
"Physical Therapy    Physical Therapy Treatment    Patient Name: Merle Dickerson  MRN: [id number]  : 1960   Today's Date: 2024  Time Calculation  Start Time: 1300  Stop Time: 1335  Time Calculation (min): 35 min  Visit #6        Current Problem  1. Subscapular bursitis  Follow Up In Physical Therapy      2. Rhomboid pain  Follow Up In Physical Therapy            Subjective   Pt reports no new complaints.       Precautions  Precautions  STEADI Fall Risk Score (The score of 4 or more indicates an increased risk of falling): 3       Pain  Pain Assessment: 0-10  Pain Score: 0 - No pain  Pain Location: Shoulder    Objective      Right Shoulder Strength and ROM WNL's    Outcome Measures:  DASH = 2%    Treatments:                                         Date: 23                                          VISIT# #3 #4 5 6     EXERCISES REPS REPS            Pulley's:  Flex 3 min 3 min 3' 3'                   Scap 3 min 3 min 3' 3'          Tband: Lat pulls YTB 2 x 10 RTB 2 x 10     Mid rows YTB 2 x 10 RTB 2 x 10     IR YTB 2 x 10 RTB 2 x 10     ER Held YTB 2 x 10     Chop    2 x 10 ytb   Ball on wall flex 10\" x 10 10 x 10\"            Serratus Punch 2 x 10 2 x 10 with 1#            Wand flex 5\" H x 10 5\" x 10            Ball circles on the wall   2 x 10  CW-CCW 2 x10  CW, CCW, Flex/Ext, abd/ add          Doorway Stretch 20\" x 5 20\" x 5 30\"x 3 3x30\"                                                                  Manual       Dry Needle(?)              HEP     x     Assessment:  Pt has responded to treatment with decrease in pain and increase in ROM.  Pt demonstrates having a good understanding of HEP and is agreeable to discharge at this time       Plan:   Discharge to HEP           Goals:  Resolved       Scapular Pain       PT Goal 1 (Met)       Start:  23    Expected End:  23    Resolved:  23    Pt will demonstrate independence with HEP to reinforce gains made in treatment  "         PT Goal 2 (Met)       Start:  11/17/23    Expected End:  12/04/23    Resolved:  12/18/23    Pt will report having a decrease in pain to 8/10 at its worst for increased tolerance for ROM exercises          PT Goal 3 (Met)       Start:  11/17/23    Expected End:  12/18/23    Resolved:  12/18/23    Pt will have an increase in right shoulder ROM to WFL's for performing reaching and self care activities         PT Goal 4 (Met)       Start:  11/20/23    Expected End:  12/18/23    Resolved:  12/18/23    Pt will have an increase in right shoulder strength by 1/3 MMT grade for lifting and reaching               .

## 2023-12-20 ENCOUNTER — APPOINTMENT (OUTPATIENT)
Dept: PHYSICAL THERAPY | Facility: HOSPITAL | Age: 63
End: 2023-12-20
Payer: MEDICARE

## 2023-12-26 ENCOUNTER — APPOINTMENT (OUTPATIENT)
Dept: PHYSICAL THERAPY | Facility: HOSPITAL | Age: 63
End: 2023-12-26
Payer: MEDICARE

## 2023-12-28 ENCOUNTER — APPOINTMENT (OUTPATIENT)
Dept: PHYSICAL THERAPY | Facility: HOSPITAL | Age: 63
End: 2023-12-28
Payer: MEDICARE

## 2024-01-04 ASSESSMENT — PAIN - FUNCTIONAL ASSESSMENT: PAIN_FUNCTIONAL_ASSESSMENT: 0-10

## 2024-01-04 ASSESSMENT — PAIN SCALES - GENERAL: PAINLEVEL_OUTOF10: 0 - NO PAIN

## 2024-01-05 ENCOUNTER — SPECIALTY PHARMACY (OUTPATIENT)
Dept: PHARMACY | Facility: CLINIC | Age: 64
End: 2024-01-05

## 2024-01-09 ENCOUNTER — PHARMACY VISIT (OUTPATIENT)
Dept: PHARMACY | Facility: CLINIC | Age: 64
End: 2024-01-09
Payer: MEDICARE

## 2024-01-09 PROCEDURE — RXMED WILLOW AMBULATORY MEDICATION CHARGE

## 2024-01-10 ENCOUNTER — SOCIAL WORK (OUTPATIENT)
Dept: CASE MANAGEMENT | Facility: HOSPITAL | Age: 64
End: 2024-01-10
Payer: COMMERCIAL

## 2024-01-11 ENCOUNTER — SOCIAL WORK (OUTPATIENT)
Dept: CASE MANAGEMENT | Facility: HOSPITAL | Age: 64
End: 2024-01-11
Payer: COMMERCIAL

## 2024-01-11 NOTE — PROGRESS NOTES
ZINA learned that patient's co-pay from the Promacta is over $1000.00. Patient unable to afford.  [facility] had initiated an application for Promacta in December when it was thought patient had no coverage. ZINA followed up with [facility] on this application. Additional information is needed. ZINA able to obtain an RX from prescriber's office. [facility] also needs a copy of patient's income. ZINA reached out to patient; message left. Patient can bring Social Security form to April (RN) in Dr. Bal's office; this will then be scanned and emailed to ZINA.  ZINA to investigate additional options for assistance as patient may qualify once income is confirmed.

## 2024-01-16 NOTE — PROGRESS NOTES
[facility] had reached out to ZINA for assistance with free Promacta from [facility] for patient. [facility] is in need of income documents to complete the free medication application. It is unclear why patient no longer qualifies from [facility] with Medicare Part D. ZINA reached out to patient; message left. IZNA to follow up.  ADDENDUM: January 12, 2024 An additional message was left for patient. SW available as needed.

## 2024-01-29 DIAGNOSIS — Z79.4 TYPE 2 DIABETES MELLITUS WITHOUT COMPLICATION, WITH LONG-TERM CURRENT USE OF INSULIN (MULTI): ICD-10-CM

## 2024-01-29 DIAGNOSIS — E11.9 TYPE 2 DIABETES MELLITUS WITHOUT COMPLICATION, WITH LONG-TERM CURRENT USE OF INSULIN (MULTI): ICD-10-CM

## 2024-01-30 DIAGNOSIS — E11.9 TYPE 2 DIABETES MELLITUS WITHOUT COMPLICATION, WITH LONG-TERM CURRENT USE OF INSULIN (MULTI): ICD-10-CM

## 2024-01-30 DIAGNOSIS — Z79.4 TYPE 2 DIABETES MELLITUS WITHOUT COMPLICATION, WITH LONG-TERM CURRENT USE OF INSULIN (MULTI): ICD-10-CM

## 2024-01-30 RX ORDER — METFORMIN HYDROCHLORIDE 500 MG/1
500 TABLET ORAL 2 TIMES DAILY
Qty: 60 TABLET | Refills: 0 | Status: SHIPPED | OUTPATIENT
Start: 2024-01-30 | End: 2024-01-30 | Stop reason: SDUPTHER

## 2024-01-31 RX ORDER — METFORMIN HYDROCHLORIDE 500 MG/1
500 TABLET ORAL 2 TIMES DAILY
Qty: 60 TABLET | Refills: 0 | Status: SHIPPED | OUTPATIENT
Start: 2024-01-31 | End: 2024-02-15 | Stop reason: SDUPTHER

## 2024-02-15 ENCOUNTER — LAB (OUTPATIENT)
Dept: LAB | Facility: LAB | Age: 64
End: 2024-02-15
Payer: MEDICARE

## 2024-02-15 ENCOUNTER — OFFICE VISIT (OUTPATIENT)
Dept: PRIMARY CARE | Facility: CLINIC | Age: 64
End: 2024-02-15
Payer: MEDICARE

## 2024-02-15 VITALS
SYSTOLIC BLOOD PRESSURE: 133 MMHG | HEART RATE: 72 BPM | TEMPERATURE: 98.1 F | HEIGHT: 67 IN | WEIGHT: 293 LBS | BODY MASS INDEX: 45.99 KG/M2 | OXYGEN SATURATION: 95 % | DIASTOLIC BLOOD PRESSURE: 84 MMHG

## 2024-02-15 DIAGNOSIS — M79.18 RHOMBOID PAIN: ICD-10-CM

## 2024-02-15 DIAGNOSIS — Z79.4 TYPE 2 DIABETES MELLITUS WITHOUT COMPLICATION, WITH LONG-TERM CURRENT USE OF INSULIN (MULTI): ICD-10-CM

## 2024-02-15 DIAGNOSIS — E78.5 HYPERLIPIDEMIA, UNSPECIFIED HYPERLIPIDEMIA TYPE: ICD-10-CM

## 2024-02-15 DIAGNOSIS — E11.9 TYPE 2 DIABETES MELLITUS WITHOUT COMPLICATION, WITH LONG-TERM CURRENT USE OF INSULIN (MULTI): ICD-10-CM

## 2024-02-15 DIAGNOSIS — I10 HYPERTENSION, UNSPECIFIED TYPE: ICD-10-CM

## 2024-02-15 DIAGNOSIS — M75.50 SUBSCAPULAR BURSITIS: Primary | ICD-10-CM

## 2024-02-15 PROCEDURE — 1126F AMNT PAIN NOTED NONE PRSNT: CPT | Performed by: FAMILY MEDICINE

## 2024-02-15 PROCEDURE — 3075F SYST BP GE 130 - 139MM HG: CPT | Performed by: FAMILY MEDICINE

## 2024-02-15 PROCEDURE — 80061 LIPID PANEL: CPT

## 2024-02-15 PROCEDURE — 99214 OFFICE O/P EST MOD 30 MIN: CPT | Performed by: FAMILY MEDICINE

## 2024-02-15 PROCEDURE — 3079F DIAST BP 80-89 MM HG: CPT | Performed by: FAMILY MEDICINE

## 2024-02-15 PROCEDURE — 80053 COMPREHEN METABOLIC PANEL: CPT

## 2024-02-15 PROCEDURE — 83036 HEMOGLOBIN GLYCOSYLATED A1C: CPT

## 2024-02-15 PROCEDURE — 3048F LDL-C <100 MG/DL: CPT | Performed by: FAMILY MEDICINE

## 2024-02-15 PROCEDURE — 3051F HG A1C>EQUAL 7.0%<8.0%: CPT | Performed by: FAMILY MEDICINE

## 2024-02-15 PROCEDURE — 1036F TOBACCO NON-USER: CPT | Performed by: FAMILY MEDICINE

## 2024-02-15 RX ORDER — LANCETS 28 GAUGE
EACH MISCELLANEOUS
COMMUNITY
Start: 2024-01-16 | End: 2024-03-20 | Stop reason: SDUPTHER

## 2024-02-15 RX ORDER — LOVASTATIN 40 MG/1
40 TABLET ORAL DAILY
Qty: 90 TABLET | Refills: 0 | Status: SHIPPED | OUTPATIENT
Start: 2024-02-15 | End: 2024-03-20 | Stop reason: SDUPTHER

## 2024-02-15 RX ORDER — INSULIN GLARGINE 100 [IU]/ML
42 INJECTION, SOLUTION SUBCUTANEOUS NIGHTLY
Qty: 12.6 ML | Refills: 2 | Status: SHIPPED | OUTPATIENT
Start: 2024-02-15 | End: 2024-05-15 | Stop reason: SDUPTHER

## 2024-02-15 RX ORDER — METOPROLOL TARTRATE 50 MG/1
50 TABLET ORAL 2 TIMES DAILY
Qty: 180 TABLET | Refills: 0 | Status: SHIPPED | OUTPATIENT
Start: 2024-02-15 | End: 2024-05-15 | Stop reason: SDUPTHER

## 2024-02-15 RX ORDER — METFORMIN HYDROCHLORIDE 500 MG/1
500 TABLET ORAL 2 TIMES DAILY
Qty: 180 TABLET | Refills: 0 | Status: SHIPPED | OUTPATIENT
Start: 2024-02-15 | End: 2024-05-15 | Stop reason: SDUPTHER

## 2024-02-15 NOTE — PROGRESS NOTES
"Subjective   Patient ID: Merle Dickerson is a 63 y.o. female who presents for Med Refill.    HPI     Pt is here for refills of medications to treat the following medical conditions. Unless otherwise stated, these conditions are well-controlled, pt is taking medications as Rx, and there are no reported side effects to medications or other treatment: DM2, Hyperlipidemia, Hypertension. Is fasting for labs. Pt states that she checks her sugars at home - they run 121-131 consistently.     Of note, pt states that she went to three     Review of Systems   All other systems reviewed and are negative.      Objective   /84   Pulse 72   Temp 36.7 °C (98.1 °F)   Ht 1.689 m (5' 6.5\")   Wt 134 kg (296 lb)   SpO2 95%   BMI 47.06 kg/m²     Physical Exam  Vitals reviewed.   Constitutional:       General: She is awake.      Appearance: Normal appearance. She is well-developed.   HENT:      Head: Normocephalic and atraumatic.   Cardiovascular:      Rate and Rhythm: Normal rate.   Pulmonary:      Effort: Pulmonary effort is normal.   Musculoskeletal:      Cervical back: Full passive range of motion without pain.      Right lower leg: No edema.      Left lower leg: No edema.   Skin:     General: Skin is warm and dry.      Findings: No lesion or rash.   Neurological:      General: No focal deficit present.      Mental Status: She is alert and oriented to person, place, and time.      Cranial Nerves: No facial asymmetry.      Motor: Motor function is intact.      Gait: Gait is intact.   Psychiatric:         Attention and Perception: Attention normal.         Mood and Affect: Mood and affect normal.         Assessment/Plan   Problem List Items Addressed This Visit             ICD-10-CM    DM type 2 (diabetes mellitus, type 2) (CMS/HCC) E11.9    Relevant Medications    metFORMIN (Glucophage) 500 mg tablet    insulin glargine (Lantus Solostar U-100 Insulin) 100 unit/mL (3 mL) pen    Other Relevant Orders    Comprehensive metabolic " panel    Hemoglobin A1c    Hypertension I10    Relevant Medications    metoprolol tartrate (Lopressor) 50 mg tablet    Other Relevant Orders    Comprehensive metabolic panel    Hyperlipidemia, unspecified E78.5    Relevant Medications    lovastatin (Mevacor) 40 mg tablet    Other Relevant Orders    Lipid panel     Chronic medications refilled without changes at this time. Labs drawn as ordered above. Will call with results and let her know if there are any needed changed to medications.

## 2024-02-16 LAB
ALBUMIN SERPL BCP-MCNC: 4.1 G/DL (ref 3.4–5)
ALP SERPL-CCNC: 99 U/L (ref 33–136)
ALT SERPL W P-5'-P-CCNC: 10 U/L (ref 7–45)
ANION GAP SERPL CALC-SCNC: 14 MMOL/L (ref 10–20)
AST SERPL W P-5'-P-CCNC: 11 U/L (ref 9–39)
BILIRUB SERPL-MCNC: 1 MG/DL (ref 0–1.2)
BUN SERPL-MCNC: 12 MG/DL (ref 6–23)
CALCIUM SERPL-MCNC: 9.2 MG/DL (ref 8.6–10.3)
CHLORIDE SERPL-SCNC: 102 MMOL/L (ref 98–107)
CHOLEST SERPL-MCNC: 143 MG/DL (ref 0–199)
CHOLESTEROL/HDL RATIO: 3.1
CO2 SERPL-SCNC: 27 MMOL/L (ref 21–32)
CREAT SERPL-MCNC: 0.69 MG/DL (ref 0.5–1.05)
EGFRCR SERPLBLD CKD-EPI 2021: >90 ML/MIN/1.73M*2
GLUCOSE SERPL-MCNC: 107 MG/DL (ref 74–99)
HDLC SERPL-MCNC: 46.4 MG/DL
LDLC SERPL CALC-MCNC: 68 MG/DL
NON HDL CHOLESTEROL: 97 MG/DL (ref 0–149)
POTASSIUM SERPL-SCNC: 4.7 MMOL/L (ref 3.5–5.3)
PROT SERPL-MCNC: 7 G/DL (ref 6.4–8.2)
SODIUM SERPL-SCNC: 138 MMOL/L (ref 136–145)
TRIGL SERPL-MCNC: 142 MG/DL (ref 0–149)
VLDL: 28 MG/DL (ref 0–40)

## 2024-02-17 LAB
EST. AVERAGE GLUCOSE BLD GHB EST-MCNC: 154 MG/DL
HBA1C MFR BLD: 7 %

## 2024-03-01 ENCOUNTER — APPOINTMENT (OUTPATIENT)
Dept: LAB | Facility: HOSPITAL | Age: 64
End: 2024-03-01
Payer: MEDICARE

## 2024-03-01 ENCOUNTER — LAB (OUTPATIENT)
Dept: LAB | Facility: LAB | Age: 64
End: 2024-03-01
Payer: MEDICARE

## 2024-03-01 DIAGNOSIS — D69.3 IMMUNE THROMBOCYTOPENIC PURPURA (MULTI): ICD-10-CM

## 2024-03-01 DIAGNOSIS — D69.3 IMMUNE THROMBOCYTOPENIC PURPURA (MULTI): Primary | ICD-10-CM

## 2024-03-01 LAB
BASOPHILS # BLD AUTO: 0.04 X10*3/UL (ref 0–0.1)
BASOPHILS NFR BLD AUTO: 0.3 %
EOSINOPHIL # BLD AUTO: 0.08 X10*3/UL (ref 0–0.7)
EOSINOPHIL NFR BLD AUTO: 0.7 %
ERYTHROCYTE [DISTWIDTH] IN BLOOD BY AUTOMATED COUNT: 15.2 % (ref 11.5–14.5)
HCT VFR BLD AUTO: 42.7 % (ref 36–46)
HGB BLD-MCNC: 13 G/DL (ref 12–16)
IMM GRANULOCYTES # BLD AUTO: 0.04 X10*3/UL (ref 0–0.7)
IMM GRANULOCYTES NFR BLD AUTO: 0.3 % (ref 0–0.9)
LYMPHOCYTES # BLD AUTO: 1.78 X10*3/UL (ref 1.2–4.8)
LYMPHOCYTES NFR BLD AUTO: 14.5 %
MCH RBC QN AUTO: 25.9 PG (ref 26–34)
MCHC RBC AUTO-ENTMCNC: 30.4 G/DL (ref 32–36)
MCV RBC AUTO: 85 FL (ref 80–100)
MONOCYTES # BLD AUTO: 0.72 X10*3/UL (ref 0.1–1)
MONOCYTES NFR BLD AUTO: 5.9 %
NEUTROPHILS # BLD AUTO: 9.64 X10*3/UL (ref 1.2–7.7)
NEUTROPHILS NFR BLD AUTO: 78.3 %
NRBC BLD-RTO: 0 /100 WBCS (ref 0–0)
PLATELET # BLD AUTO: 295 X10*3/UL (ref 150–450)
RBC # BLD AUTO: 5.01 X10*6/UL (ref 4–5.2)
WBC # BLD AUTO: 12.3 X10*3/UL (ref 4.4–11.3)

## 2024-03-01 PROCEDURE — 36415 COLL VENOUS BLD VENIPUNCTURE: CPT

## 2024-03-01 PROCEDURE — 85025 COMPLETE CBC W/AUTO DIFF WBC: CPT

## 2024-03-07 ENCOUNTER — OFFICE VISIT (OUTPATIENT)
Dept: HEMATOLOGY/ONCOLOGY | Facility: CLINIC | Age: 64
End: 2024-03-07
Payer: MEDICARE

## 2024-03-07 VITALS
HEIGHT: 67 IN | BODY MASS INDEX: 45.76 KG/M2 | RESPIRATION RATE: 16 BRPM | TEMPERATURE: 97.9 F | HEART RATE: 97 BPM | WEIGHT: 291.56 LBS | DIASTOLIC BLOOD PRESSURE: 92 MMHG | OXYGEN SATURATION: 94 % | SYSTOLIC BLOOD PRESSURE: 146 MMHG

## 2024-03-07 DIAGNOSIS — D69.3 IDIOPATHIC THROMBOCYTOPENIC PURPURA (MULTI): Primary | ICD-10-CM

## 2024-03-07 PROCEDURE — 99213 OFFICE O/P EST LOW 20 MIN: CPT | Performed by: INTERNAL MEDICINE

## 2024-03-07 ASSESSMENT — PATIENT HEALTH QUESTIONNAIRE - PHQ9
2. FEELING DOWN, DEPRESSED OR HOPELESS: NOT AT ALL
SUM OF ALL RESPONSES TO PHQ9 QUESTIONS 1 AND 2: 0
1. LITTLE INTEREST OR PLEASURE IN DOING THINGS: NOT AT ALL

## 2024-03-07 ASSESSMENT — COLUMBIA-SUICIDE SEVERITY RATING SCALE - C-SSRS
2. HAVE YOU ACTUALLY HAD ANY THOUGHTS OF KILLING YOURSELF?: NO
6. HAVE YOU EVER DONE ANYTHING, STARTED TO DO ANYTHING, OR PREPARED TO DO ANYTHING TO END YOUR LIFE?: NO
1. IN THE PAST MONTH, HAVE YOU WISHED YOU WERE DEAD OR WISHED YOU COULD GO TO SLEEP AND NOT WAKE UP?: NO

## 2024-03-07 ASSESSMENT — PAIN SCALES - GENERAL: PAINLEVEL: 0-NO PAIN

## 2024-03-07 NOTE — PROGRESS NOTES
"Patient ID: Merle Dickerson is a 63 y.o. female.    Subjective    HPI   ITP diagnosed January 2021:  presented with epistaxis and PLT 3000.  She was unable to be weaned to low dose steroids; no response to rituxan; N-plate 8/2021-12/2021; on promacta since December 16, 2021.  PLT count has been normal for the past year  Right frontal meningioma (WHO grade I) January 2021 s/p craniotomy 4/23/21 (Dr. Cason); declined adjuvant RT  DM    The patient presents today for 6 month follow up.  She continues on promacta.  She had an episode of labrynthitis in November 2023 and also had right shoulder pain that required PT.  These symptoms have resolved.  She feels very well today.  Has not taken steroids or had steroid injections recently    Objective    BSA: 2.5 meters squared  BP (!) 146/92   Pulse 97   Temp 36.6 °C (97.9 °F)   Resp 16   Ht (S) 1.698 m (5' 6.85\") Comment: Simultaneous filing. User may not have seen previous data.  Wt 132 kg (291 lb 8.9 oz)   SpO2 94%   BMI 45.87 kg/m²     CBC 3/1/24:  WBC 12.5; HGB 13;  (WBC 12.2 December 2023).     Physical Exam  HEENT: no oral lesions; no icterus; no cervical LAD or thryomegaly  CV:  RRR and no murmurs/rubs or gallops  Chest:  CTA bilaterally  Abd: soft and non-tender; non distended without organomegaly  Extremities:  no edema; no rashes; no petechiae  Performance Status:  Asymptomatic      Assessment/Plan   History of ITP on promacta with good control of her count.  Would continue that at current dose.  Her WBC is slightly elevated and has been for last 3 months:  ? Cause-will continue to monitor.  Since the WBC is newly elevated, recommend repeat CBC and reassessment in 3 months rather than 6 months.  Patient understood and was agreeable.    Cancer Staging   No matching staging information was found for the patient.    Oncology History    No history exists.                 Annette Bal MD            "

## 2024-03-20 ENCOUNTER — OFFICE VISIT (OUTPATIENT)
Dept: PRIMARY CARE | Facility: CLINIC | Age: 64
End: 2024-03-20
Payer: MEDICARE

## 2024-03-20 VITALS
SYSTOLIC BLOOD PRESSURE: 133 MMHG | WEIGHT: 292 LBS | HEIGHT: 67 IN | HEART RATE: 75 BPM | BODY MASS INDEX: 45.83 KG/M2 | TEMPERATURE: 97.6 F | OXYGEN SATURATION: 95 % | DIASTOLIC BLOOD PRESSURE: 85 MMHG

## 2024-03-20 DIAGNOSIS — Z79.4 TYPE 2 DIABETES MELLITUS WITHOUT COMPLICATION, WITH LONG-TERM CURRENT USE OF INSULIN (MULTI): ICD-10-CM

## 2024-03-20 DIAGNOSIS — H60.331 ACUTE SWIMMER'S EAR OF RIGHT SIDE: Primary | ICD-10-CM

## 2024-03-20 DIAGNOSIS — E78.5 HYPERLIPIDEMIA, UNSPECIFIED HYPERLIPIDEMIA TYPE: ICD-10-CM

## 2024-03-20 DIAGNOSIS — E11.9 TYPE 2 DIABETES MELLITUS WITHOUT COMPLICATION, WITH LONG-TERM CURRENT USE OF INSULIN (MULTI): ICD-10-CM

## 2024-03-20 PROCEDURE — 99214 OFFICE O/P EST MOD 30 MIN: CPT | Performed by: FAMILY MEDICINE

## 2024-03-20 PROCEDURE — 1159F MED LIST DOCD IN RCRD: CPT | Performed by: FAMILY MEDICINE

## 2024-03-20 PROCEDURE — 3075F SYST BP GE 130 - 139MM HG: CPT | Performed by: FAMILY MEDICINE

## 2024-03-20 PROCEDURE — 3051F HG A1C>EQUAL 7.0%<8.0%: CPT | Performed by: FAMILY MEDICINE

## 2024-03-20 PROCEDURE — 1036F TOBACCO NON-USER: CPT | Performed by: FAMILY MEDICINE

## 2024-03-20 PROCEDURE — 3079F DIAST BP 80-89 MM HG: CPT | Performed by: FAMILY MEDICINE

## 2024-03-20 PROCEDURE — 3048F LDL-C <100 MG/DL: CPT | Performed by: FAMILY MEDICINE

## 2024-03-20 RX ORDER — PEN NEEDLE, DIABETIC 30 GX3/16"
NEEDLE, DISPOSABLE MISCELLANEOUS
Qty: 100 EACH | Refills: 11 | Status: SHIPPED | OUTPATIENT
Start: 2024-03-20

## 2024-03-20 RX ORDER — NEOMYCIN SULFATE, POLYMYXIN B SULFATE, HYDROCORTISONE 3.5; 10000; 1 MG/ML; [USP'U]/ML; MG/ML
4 SOLUTION/ DROPS AURICULAR (OTIC) 4 TIMES DAILY
Qty: 10 ML | Refills: 0 | Status: SHIPPED | OUTPATIENT
Start: 2024-03-20 | End: 2024-03-27

## 2024-03-20 RX ORDER — LOVASTATIN 40 MG/1
40 TABLET ORAL DAILY
Qty: 90 TABLET | Refills: 0 | Status: SHIPPED | OUTPATIENT
Start: 2024-03-20 | End: 2024-05-15 | Stop reason: SDUPTHER

## 2024-03-20 NOTE — PROGRESS NOTES
"Subjective   Patient ID: Merle Dickerson is a 63 y.o. female who presents for Earache.    HPI C/o right ear pain x 4 days. Has tried Tylenol.     Also is requesting a refill of needles. Also states she never received her Rx of Lovastatin and is requesting us to send a new Rx to the pharmacy.     Review of Systems   All other systems reviewed and are negative.      Objective   /85   Pulse 75   Temp 36.4 °C (97.6 °F)   Ht 1.698 m (5' 6.85\")   Wt 132 kg (292 lb)   SpO2 95%   BMI 45.94 kg/m²     Physical Exam  Constitutional:       Appearance: Normal appearance.   HENT:      Right Ear: Tympanic membrane normal.      Left Ear: Tympanic membrane normal.      Ears:      Comments: Pt has moderately edematous and erythematous right ear canal.     Mouth/Throat:      Mouth: Mucous membranes are moist.   Eyes:      Conjunctiva/sclera: Conjunctivae normal.   Cardiovascular:      Rate and Rhythm: Normal rate and regular rhythm.   Pulmonary:      Effort: Pulmonary effort is normal.      Breath sounds: Normal breath sounds.   Abdominal:      Palpations: Abdomen is soft.   Musculoskeletal:         General: Normal range of motion.   Skin:     General: Skin is warm and dry.   Neurological:      Mental Status: She is alert and oriented to person, place, and time.   Psychiatric:         Mood and Affect: Mood normal.         Assessment/Plan   Diagnoses and all orders for this visit:  Acute swimmer's ear of right side  -     neomycin-polymyxin-HC (Cortisporin) otic solution; Administer 4 drops into the right ear 4 times a day for 7 days.  Type 2 diabetes mellitus without complication, with long-term current use of insulin (CMS/[facility])  -     pen needle, diabetic (TRUEplus Pen Needle) 31 gauge x 5/16\" needle; Use once daily as directed  Hyperlipidemia, unspecified hyperlipidemia type  -     lovastatin (Mevacor) 40 mg tablet; Take 1 tablet (40 mg) by mouth once daily.       "

## 2024-05-15 ENCOUNTER — LAB (OUTPATIENT)
Dept: LAB | Facility: LAB | Age: 64
End: 2024-05-15
Payer: COMMERCIAL

## 2024-05-15 ENCOUNTER — OFFICE VISIT (OUTPATIENT)
Dept: PRIMARY CARE | Facility: CLINIC | Age: 64
End: 2024-05-15
Payer: COMMERCIAL

## 2024-05-15 VITALS
HEART RATE: 94 BPM | HEIGHT: 67 IN | WEIGHT: 286 LBS | OXYGEN SATURATION: 96 % | DIASTOLIC BLOOD PRESSURE: 88 MMHG | SYSTOLIC BLOOD PRESSURE: 132 MMHG | BODY MASS INDEX: 44.89 KG/M2

## 2024-05-15 DIAGNOSIS — E11.9 TYPE 2 DIABETES MELLITUS WITHOUT COMPLICATION, WITH LONG-TERM CURRENT USE OF INSULIN (MULTI): ICD-10-CM

## 2024-05-15 DIAGNOSIS — Z79.4 TYPE 2 DIABETES MELLITUS WITHOUT COMPLICATION, WITH LONG-TERM CURRENT USE OF INSULIN (MULTI): ICD-10-CM

## 2024-05-15 DIAGNOSIS — E78.5 HYPERLIPIDEMIA, UNSPECIFIED HYPERLIPIDEMIA TYPE: ICD-10-CM

## 2024-05-15 DIAGNOSIS — I10 HYPERTENSION, UNSPECIFIED TYPE: ICD-10-CM

## 2024-05-15 LAB
ALBUMIN SERPL BCP-MCNC: 4.1 G/DL (ref 3.4–5)
ALP SERPL-CCNC: 76 U/L (ref 33–136)
ALT SERPL W P-5'-P-CCNC: 16 U/L (ref 7–45)
ANION GAP SERPL CALC-SCNC: 15 MMOL/L (ref 10–20)
AST SERPL W P-5'-P-CCNC: 26 U/L (ref 9–39)
BILIRUB SERPL-MCNC: 0.7 MG/DL (ref 0–1.2)
BUN SERPL-MCNC: 15 MG/DL (ref 6–23)
CALCIUM SERPL-MCNC: 9.2 MG/DL (ref 8.6–10.3)
CHLORIDE SERPL-SCNC: 104 MMOL/L (ref 98–107)
CHOLEST SERPL-MCNC: 120 MG/DL (ref 0–199)
CHOLESTEROL/HDL RATIO: 2.8
CO2 SERPL-SCNC: 22 MMOL/L (ref 21–32)
CREAT SERPL-MCNC: 0.65 MG/DL (ref 0.5–1.05)
EGFRCR SERPLBLD CKD-EPI 2021: >90 ML/MIN/1.73M*2
EST. AVERAGE GLUCOSE BLD GHB EST-MCNC: 157 MG/DL
GLUCOSE SERPL-MCNC: 97 MG/DL (ref 74–99)
HBA1C MFR BLD: 7.1 %
HDLC SERPL-MCNC: 43.3 MG/DL
LDLC SERPL CALC-MCNC: 53 MG/DL
NON HDL CHOLESTEROL: 77 MG/DL (ref 0–149)
POTASSIUM SERPL-SCNC: 4.3 MMOL/L (ref 3.5–5.3)
PROT SERPL-MCNC: 7.4 G/DL (ref 6.4–8.2)
SODIUM SERPL-SCNC: 137 MMOL/L (ref 136–145)
TRIGL SERPL-MCNC: 118 MG/DL (ref 0–149)
VLDL: 24 MG/DL (ref 0–40)

## 2024-05-15 PROCEDURE — 80061 LIPID PANEL: CPT

## 2024-05-15 PROCEDURE — 36415 COLL VENOUS BLD VENIPUNCTURE: CPT

## 2024-05-15 PROCEDURE — 80053 COMPREHEN METABOLIC PANEL: CPT

## 2024-05-15 PROCEDURE — 83036 HEMOGLOBIN GLYCOSYLATED A1C: CPT

## 2024-05-15 PROCEDURE — 3051F HG A1C>EQUAL 7.0%<8.0%: CPT | Performed by: FAMILY MEDICINE

## 2024-05-15 PROCEDURE — 3079F DIAST BP 80-89 MM HG: CPT | Performed by: FAMILY MEDICINE

## 2024-05-15 PROCEDURE — 1159F MED LIST DOCD IN RCRD: CPT | Performed by: FAMILY MEDICINE

## 2024-05-15 PROCEDURE — 99214 OFFICE O/P EST MOD 30 MIN: CPT | Performed by: FAMILY MEDICINE

## 2024-05-15 PROCEDURE — 1036F TOBACCO NON-USER: CPT | Performed by: FAMILY MEDICINE

## 2024-05-15 PROCEDURE — 3075F SYST BP GE 130 - 139MM HG: CPT | Performed by: FAMILY MEDICINE

## 2024-05-15 PROCEDURE — 3048F LDL-C <100 MG/DL: CPT | Performed by: FAMILY MEDICINE

## 2024-05-15 RX ORDER — METFORMIN HYDROCHLORIDE 500 MG/1
500 TABLET ORAL 2 TIMES DAILY
Qty: 180 TABLET | Refills: 0 | Status: SHIPPED | OUTPATIENT
Start: 2024-05-15 | End: 2024-08-13

## 2024-05-15 RX ORDER — LOVASTATIN 40 MG/1
40 TABLET ORAL DAILY
Qty: 90 TABLET | Refills: 0 | Status: SHIPPED | OUTPATIENT
Start: 2024-05-15 | End: 2024-08-13

## 2024-05-15 RX ORDER — METOPROLOL TARTRATE 50 MG/1
50 TABLET ORAL 2 TIMES DAILY
Qty: 180 TABLET | Refills: 0 | Status: SHIPPED | OUTPATIENT
Start: 2024-05-15 | End: 2024-08-13

## 2024-05-15 RX ORDER — INSULIN GLARGINE 100 [IU]/ML
42 INJECTION, SOLUTION SUBCUTANEOUS NIGHTLY
Qty: 12.6 ML | Refills: 2 | Status: SHIPPED | OUTPATIENT
Start: 2024-05-15 | End: 2024-08-13

## 2024-05-15 NOTE — PROGRESS NOTES
"Subjective   Patient ID: Merle Dickerson is a 63 y.o. female who presents for Med Refill.    HPI Pt is here for refills of medications to treat the following medical conditions. Unless otherwise stated, these conditions are well-controlled, pt is taking medications s Rx, and there are no reported side effects to medications or other treatment: DM2, Hyperlipidemia, Hypertension.     Also is averaging three episodes daily over the past month. Denies blood in stool. Denies abdominal pain. Takes Imodium with temporary relief.     Review of Systems   All other systems reviewed and are negative.      Objective   /88   Pulse 94   Ht 1.698 m (5' 6.85\")   Wt 130 kg (286 lb)   SpO2 96%   BMI 45.00 kg/m²     Physical Exam  Constitutional:       Appearance: Normal appearance.   HENT:      Mouth/Throat:      Mouth: Mucous membranes are moist.   Eyes:      Conjunctiva/sclera: Conjunctivae normal.   Cardiovascular:      Rate and Rhythm: Normal rate and regular rhythm.   Pulmonary:      Effort: Pulmonary effort is normal.      Breath sounds: Normal breath sounds.   Abdominal:      Palpations: Abdomen is soft.   Musculoskeletal:         General: Normal range of motion.   Skin:     General: Skin is warm and dry.   Neurological:      Mental Status: She is alert and oriented to person, place, and time.   Psychiatric:         Mood and Affect: Mood normal.         Assessment/Plan   Diagnoses and all orders for this visit:  Hypertension, unspecified type  Type 2 diabetes mellitus without complication, with long-term current use of insulin (Multi)  -     Comprehensive metabolic panel; Future  -     Hemoglobin A1c; Future  Hyperlipidemia, unspecified hyperlipidemia type  -     Lipid panel; Future     All chronic medications refilled per protocol without changes  "

## 2024-05-23 ENCOUNTER — OFFICE VISIT (OUTPATIENT)
Dept: HEMATOLOGY/ONCOLOGY | Facility: CLINIC | Age: 64
End: 2024-05-23
Payer: COMMERCIAL

## 2024-05-23 VITALS — BODY MASS INDEX: 44.99 KG/M2 | TEMPERATURE: 97.3 F | WEIGHT: 285.94 LBS | RESPIRATION RATE: 18 BRPM

## 2024-05-23 DIAGNOSIS — D69.3 IDIOPATHIC THROMBOCYTOPENIC PURPURA (MULTI): ICD-10-CM

## 2024-05-23 DIAGNOSIS — R19.7 DIARRHEA, UNSPECIFIED TYPE: Primary | ICD-10-CM

## 2024-05-23 LAB
BASOPHILS # BLD AUTO: 0.03 X10*3/UL (ref 0–0.1)
BASOPHILS NFR BLD AUTO: 0.3 %
EOSINOPHIL # BLD AUTO: 0.17 X10*3/UL (ref 0–0.7)
EOSINOPHIL NFR BLD AUTO: 1.9 %
ERYTHROCYTE [DISTWIDTH] IN BLOOD BY AUTOMATED COUNT: 15.1 % (ref 11.5–14.5)
HCT VFR BLD AUTO: 40.7 % (ref 36–46)
HGB BLD-MCNC: 13 G/DL (ref 12–16)
IMM GRANULOCYTES # BLD AUTO: 0.03 X10*3/UL (ref 0–0.7)
IMM GRANULOCYTES NFR BLD AUTO: 0.3 % (ref 0–0.9)
LYMPHOCYTES # BLD AUTO: 1.32 X10*3/UL (ref 1.2–4.8)
LYMPHOCYTES NFR BLD AUTO: 15.1 %
MCH RBC QN AUTO: 26 PG (ref 26–34)
MCHC RBC AUTO-ENTMCNC: 31.9 G/DL (ref 32–36)
MCV RBC AUTO: 81 FL (ref 80–100)
MONOCYTES # BLD AUTO: 0.57 X10*3/UL (ref 0.1–1)
MONOCYTES NFR BLD AUTO: 6.5 %
NEUTROPHILS # BLD AUTO: 6.64 X10*3/UL (ref 1.2–7.7)
NEUTROPHILS NFR BLD AUTO: 75.9 %
PLATELET # BLD AUTO: 264 X10*3/UL (ref 150–450)
RBC # BLD AUTO: 5 X10*6/UL (ref 4–5.2)
WBC # BLD AUTO: 8.8 X10*3/UL (ref 4.4–11.3)

## 2024-05-23 PROCEDURE — 36415 COLL VENOUS BLD VENIPUNCTURE: CPT | Performed by: INTERNAL MEDICINE

## 2024-05-23 PROCEDURE — 85025 COMPLETE CBC W/AUTO DIFF WBC: CPT | Performed by: INTERNAL MEDICINE

## 2024-05-23 PROCEDURE — 99214 OFFICE O/P EST MOD 30 MIN: CPT | Performed by: INTERNAL MEDICINE

## 2024-05-23 NOTE — PROGRESS NOTES
Patient ID: Merle Dickerson is a 63 y.o. female.    Subjective    HPI  ITP diagnosed January 2021:  presented with epistaxis and PLT 3000.  She was unable to be weaned to low dose steroids; no response to rituxan; N-plate 8/2021-12/2021; on promacta since December 16, 2021.  PLT count has been normal for the past year  Right frontal meningioma (WHO grade I) January 2021 s/p craniotomy 4/23/21 (Dr. Cason); declined adjuvant RT  DM    The patient presents today for short-term follow-up.  When I last saw her in early March, there was some concern because her white blood cell count had been elevated to about 12,000 and the etiology was uncertain.  Today, she informs me that she has had diarrhea for the past 5 weeks.  She states that she has diarrhea every time that she eats.  She has been taking an Imodium daily which does keep the symptoms under control.  Her primary care physician thinks she may have ulcerative colitis.  The patient has not had a colonoscopy.  Her stools are not bloody, she is not losing weight and she is not in pain.  Objective    BSA: 2.48 meters squared  Temp 36.3 °C (97.3 °F)   Resp 18   Wt 130 kg (285 lb 15 oz)   BMI 44.99 kg/m²      Physical Exam  Constitutional:       Appearance: Normal appearance.   HENT:      Head: Normocephalic and atraumatic.      Mouth/Throat:      Mouth: Mucous membranes are moist.      Pharynx: Oropharynx is clear.   Eyes:      Extraocular Movements: Extraocular movements intact.      Pupils: Pupils are equal, round, and reactive to light.   Cardiovascular:      Rate and Rhythm: Normal rate and regular rhythm.      Pulses: Normal pulses.   Pulmonary:      Effort: Pulmonary effort is normal.      Breath sounds: Normal breath sounds.   Abdominal:      General: Bowel sounds are normal.      Palpations: Abdomen is soft.   Musculoskeletal:         General: Normal range of motion.      Cervical back: Normal range of motion.   Skin:     General: Skin is warm and dry.    Neurological:      General: No focal deficit present.      Mental Status: She is alert and oriented to person, place, and time.   Psychiatric:         Mood and Affect: Mood normal.         Behavior: Behavior normal.     Her CBC today shows white blood cell count 8760 with normal differential, hemoglobin 13 and platelet count 264,000    Performance Status:  ECOG 0      Assessment/Plan   63-year-old female with ITP on Promacta very good control of her platelet count.  She has been on the Promacta for 3 years and has had no issues with it.  I do not think that her current GI symptoms are related to the Promacta.  I am not sure if she has ulcerative colitis but I would recommend evaluation by gastroenterologist.  Patient states that she will not pursue a colonoscopy but she should at least have a discussion with a gastroenterologist before considering any type of immunosuppressive systemic therapy.  Her leukocytosis has resolved and her platelet count is acceptable.  She would like to be reassessed in 3 months after her GI referral is completed.  Plan:  Continue Promacta at current dose and schedule  GI referral  Imodium as needed  Reassess in 3 months with CBC day of next office visit    Cancer Staging   No matching staging information was found for the patient.      Oncology History    No history exists.                 Annette Bal MD

## 2024-06-06 ENCOUNTER — APPOINTMENT (OUTPATIENT)
Dept: HEMATOLOGY/ONCOLOGY | Facility: CLINIC | Age: 64
End: 2024-06-06
Payer: MEDICARE

## 2024-06-13 ENCOUNTER — APPOINTMENT (OUTPATIENT)
Dept: HEMATOLOGY/ONCOLOGY | Facility: CLINIC | Age: 64
End: 2024-06-13
Payer: MEDICARE

## 2024-07-10 ENCOUNTER — APPOINTMENT (OUTPATIENT)
Dept: GASTROENTEROLOGY | Facility: CLINIC | Age: 64
End: 2024-07-10
Payer: COMMERCIAL

## 2024-07-10 VITALS
HEART RATE: 82 BPM | SYSTOLIC BLOOD PRESSURE: 125 MMHG | BODY MASS INDEX: 43.35 KG/M2 | WEIGHT: 286 LBS | OXYGEN SATURATION: 93 % | DIASTOLIC BLOOD PRESSURE: 86 MMHG | HEIGHT: 68 IN

## 2024-07-10 DIAGNOSIS — R19.7 DIARRHEA, UNSPECIFIED TYPE: ICD-10-CM

## 2024-07-10 PROCEDURE — 1160F RVW MEDS BY RX/DR IN RCRD: CPT | Performed by: INTERNAL MEDICINE

## 2024-07-10 PROCEDURE — 99204 OFFICE O/P NEW MOD 45 MIN: CPT | Performed by: INTERNAL MEDICINE

## 2024-07-10 PROCEDURE — 1159F MED LIST DOCD IN RCRD: CPT | Performed by: INTERNAL MEDICINE

## 2024-07-10 PROCEDURE — 3051F HG A1C>EQUAL 7.0%<8.0%: CPT | Performed by: INTERNAL MEDICINE

## 2024-07-10 PROCEDURE — 3079F DIAST BP 80-89 MM HG: CPT | Performed by: INTERNAL MEDICINE

## 2024-07-10 PROCEDURE — 3048F LDL-C <100 MG/DL: CPT | Performed by: INTERNAL MEDICINE

## 2024-07-10 PROCEDURE — 3074F SYST BP LT 130 MM HG: CPT | Performed by: INTERNAL MEDICINE

## 2024-07-10 NOTE — PROGRESS NOTES
[facility]astroenterology    ASSESSMENT and PLAN:       Merle Dickerson is a 63 y.o. female with a significant past medical history of HTN, ITP, DM2, brain tumor (meningioma), HLD, obesity (BMI 45), and OA who presents for consultation requested by her oncologist (Annette Bal DO) for the evaluation of diarrhea.       Diarrhea  Persistent symptoms for several months without alarm/red flag features. Unclear etiology, but would suspect IBS (possibly post-infectious IBS). Her PCP was reportedly concerned for ulcerative colitis. While patients with UC will certainly have diarrhea, I do not see clinical features that would be concerning for UC in this patient. There has also been concern for medication side effects. Promacta has diarrhea listed as a side effect (9% of patients), but that seems unlikely as she has been on this medication significantly longer than she has had symptoms. Will check labs to evaluate for other causes of symptoms. Discussed colonoscopy (she is due for screening and would be helpful to evaluate diarrhea), but she is not interested in that.  - labs ordered  - PRN Imodium for symptoms      Follow up in 3 months.        Rah Dykes MD        Gastroenterology    [facility]    Clinical   [facility]        Subjective   HISTORY OF PRESENT ILLNESS:     Chief Complaint  Diarrhea    History Of Present Illness:    Merle Dickerson is a 63 y.o. female with a significant past medical history of HTN, ITP, DM2, brain tumor (meningioma), HLD, obesity (BMI 45), and OA who presents for consultation requested by her oncologist (Annette Bal DO) for the evaluation of diarrhea.    She follows with Quinn Singleton DO as her PCP.     She was seen by her PCP in May and reported diarrhea. She was then seen by oncology (Dr. Bal) who reported that her diarrhea was less likely related to  "Promacta. Labs have shown normal Hgb, normal platelets, and normal CMP. TSH was low in 2021. No endoscopy reports in the EMR.    Today she says that in April she had an ear infection and she was given drops for her ear. She says that after that she developed diarrhea. She thought that it was because of the ear drops initially, but symptoms have continued. She subsequently saw her PCP and she says that her PCP suspected ulcerative colitis and also thought that Promacta might be contributing. She has been on Promacta for 2 years without difficulty and when she saw her oncologist it was thought that side effect from Promacta was less likely.    Since April she says that she has been having 4 BMs per day which are watery. No blood in her stool. BMs are exclusively in the morning and she denies any nocturnal BMs. She has tried Imodium which works. She has not noticed any triggers for her symptoms, but BMs are in the morning after she eats. She has tried eliminating dairy and she also says that she has tried eliminating \"everything\", but symptoms did not change. She has abdominal pain that is around the umbilicus and a dull and full/bloated feeling. This resolves with a BM. She says multiple times that she is very sure that she does not want a colonoscopy.      Patient denies any heartburn/GERD, N/V, dysphagia, odynophagia, constipation, hematemesis, hematochezia, melena, or weight loss.      Review of systems:   Review of Systems   Constitutional:  Negative for chills, fatigue, fever and unexpected weight change.   HENT:  Negative for congestion, sneezing, sore throat, trouble swallowing and voice change.    Respiratory:  Negative for cough, shortness of breath and wheezing.    Cardiovascular:  Negative for chest pain, palpitations and leg swelling.   Gastrointestinal:         As detailed above.   Genitourinary:  Negative for dysuria and hematuria.   Musculoskeletal:  Negative for arthralgias and myalgias.   Skin:  " Negative for pallor and rash.   Neurological:  Negative for dizziness, seizures, syncope, weakness, numbness and headaches.   Hematological:  Negative for adenopathy. Does not bruise/bleed easily.   Psychiatric/Behavioral:  Negative for confusion. The patient is not nervous/anxious.    All other systems reviewed and are negative.      I performed a complete 10 point review of systems and it is negative except as noted in HPI or above.      PAST HISTORIES:       Past Medical History:  Patient Active Problem List   Diagnosis    Back strain    Brain tumor (Multi)    Mass of brain    Meningioma (Multi)    Cellulitis of face    Vertigo    Type 2 diabetes mellitus (Multi)    Epistaxis    Left knee pain    Hypertension    Hyperlipidemia    Overactive bladder    Morbid obesity (Multi)    Sinusitis    Primary osteoarthritis of right knee    Primary osteoarthritis of left knee    OA (osteoarthritis) of knee    Pain    Thrombocytopenia, idiopathic (Multi)    Subscapular bursitis    Muscle pain     She has a past medical history of Other specified health status (05/10/2021).    Past Surgical History:  She has a past surgical history that includes Other surgical history (05/10/2021); Other surgical history (05/10/2021); Other surgical history (03/29/2021); Other surgical history (05/10/2021); CT angio head w and wo IV contrast (1/13/2021); CT angio neck (1/13/2021); MR angio head wo IV contrast (2/10/2021); and MR angio neck wo IV contrast (2/10/2021).      Social History:  She reports that she has never smoked. She has never used smokeless tobacco. She reports that she does not drink alcohol and does not use drugs.    Family History:  No known family history of GI disease, specifically denies any family history of pancreatitis, Crohn's, colon cancer, gastroesophageal cancer, or ulcerative colitis.    No family history on file.     Allergies:  Clindamycin, Sulfamethoxazole-trimethoprim, and Amoxicillin      Objective  "  OBJECTIVE:       Last Recorded Vitals:  Vitals:    07/10/24 1531   BP: 125/86   Pulse: 82   SpO2: 93%   Weight: 130 kg (286 lb)   Height: 1.715 m (5' 7.5\")     /86   Pulse 82   Ht 1.715 m (5' 7.5\")   Wt 130 kg (286 lb)   SpO2 93%   BMI 44.13 kg/m²      Physical Exam:    Physical Exam  Vitals reviewed.   Constitutional:       General: She is not in acute distress.     Appearance: She is obese. She is not ill-appearing.   HENT:      Head: Normocephalic and atraumatic.   Eyes:      General: No scleral icterus.  Cardiovascular:      Rate and Rhythm: Normal rate and regular rhythm.      Pulses: Normal pulses.      Heart sounds: Normal heart sounds. No murmur heard.  Pulmonary:      Effort: Pulmonary effort is normal. No respiratory distress.      Breath sounds: Normal breath sounds. No wheezing.   Abdominal:      General: Bowel sounds are normal.      Palpations: Abdomen is soft.      Tenderness: There is no abdominal tenderness. There is no rebound.   Musculoskeletal:         General: No swelling or deformity.   Skin:     General: Skin is warm and dry.      Coloration: Skin is not jaundiced.      Findings: No rash.   Neurological:      General: No focal deficit present.      Mental Status: She is alert and oriented to person, place, and time.   Psychiatric:         Mood and Affect: Mood normal.         Behavior: Behavior normal.         Thought Content: Thought content normal.         Judgment: Judgment normal.         Home Medications:  Prior to Admission medications    Medication Sig Start Date End Date Taking? Authorizing Provider   blood sugar diagnostic (Blood Glucose Test) strip 2 times a day. 11/12/21   Historical Provider, MD   eltrombopag (Promacta) 75 mg tablet TAKE ONE (1) TABLET BY MOUTH ONCE DAILY ON AN EMPTY STOMACH. TAKE 1 HOUR BEFORE OR 2 HOURS AFTER A MEAL. 9/21/23 9/20/24  Annette Bal MD   eltrombopag (Promacta) 75 mg tablet Take one (1) tablet by mouth once daily on an empty " "stomach. Take 1 hour before or 2 hours after a meal. Orally Once a day 30 days 1/8/24      insulin glargine (Lantus Solostar U-100 Insulin) 100 unit/mL (3 mL) pen Inject 42 Units under the skin once daily at bedtime. 5/15/24 8/13/24  Quinn Singleton, DO   lovastatin (Mevacor) 40 mg tablet Take 1 tablet (40 mg) by mouth once daily. 5/15/24 8/13/24  Quinn Singleton, DO   metFORMIN (Glucophage) 500 mg tablet Take 1 tablet (500 mg) by mouth 2 times a day. Appointment needed for refills 5/15/24 8/13/24  Quinn Singleton DO   metoprolol tartrate (Lopressor) 50 mg tablet Take 1 tablet by mouth 2 times a day. 5/15/24 8/13/24  Quinn Singleton DO   pen needle, diabetic (TRUEplus Pen Needle) 31 gauge x 5/16\" needle Use once daily as directed 3/20/24   Quinn Singleton DO         Relevant Results Recent labs reviewed in the EMR.    Lab Results   Component Value Date/Time    WBC 8.8 05/23/2024 1238    HGB 13.0 05/23/2024 1238    HGB 13.0 03/01/2024 1518    HGB 13.2 12/04/2023 1331    MCV 81 05/23/2024 1238     05/23/2024 1238     03/01/2024 1518     12/04/2023 1331    IRON 61 02/09/2021 0049    TIBC 328 02/09/2021 0049    IRONSAT 19 (L) 02/09/2021 0049    FERRITIN 206 (H) 02/09/2021 0049    [id number] 439 02/09/2021 0049    FOLATE 9.7 02/09/2021 0049       Lab Results   Component Value Date/Time     05/15/2024 1341    K 4.3 05/15/2024 1341     05/15/2024 1341    BUN 15 05/15/2024 1341    CREATININE 0.65 05/15/2024 1341    CREATININE 0.69 02/15/2024 1550       Lab Results   Component Value Date/Time    BILITOT 0.7 05/15/2024 1341    BILITOT 1.0 02/15/2024 1550    BILITOT 1.0 10/10/2023 1439    BILIDIR 0.2 02/09/2021 0049    ALKPHOS 76 05/15/2024 1341    ALKPHOS 99 02/15/2024 1550    ALKPHOS 97 10/10/2023 1439    AST 26 05/15/2024 1341    AST 11 02/15/2024 1550    AST 11 10/10/2023 1439    ALT 16 05/15/2024 1341    ALT 10 02/15/2024 1550    ALT 10 10/10/2023 1439       No results " "found for: \"CRP\", \"CALPS\"    Radiology: Imaging reviewed in the EMR.  No results found.    === 01/16/21 ===    - Impression -  No acute abnormality of the chest, abdomen and pelvis.    Specifically, no evidence of primary malignancy or metastatic disease.      "

## 2024-07-10 NOTE — PATIENT INSTRUCTIONS
I have ordered labs to look into your symptoms.      I recommend that you use Imodium for diarrhea. You can take this as needed. If you need to take it regularly that is okay. Start by taking it once per day. I would suggest taking it first thing in the morning, before breakfast. You can gradually add additional doses before lunch, before dinner, and at bedtime if you need to. If you are taking one pill four times per day and still having symptoms let me know.      Follow up in 3 months.

## 2024-07-10 NOTE — LETTER
July 11, 2024     Annette Bal MD  [address]  [place] [address]    Patient: Merle Dickerson   YOB: 1960   Date of Visit: 7/10/2024       Dear Dr. Annette Bal MD:    Thank you for referring Merle Dickerson to me for evaluation. Below are my notes for this consultation.  If you have questions, please do not hesitate to call me. I look forward to following your patient along with you.       Sincerely,     Rah Dykes MD      CC: Quinn Singleton DO  ______________________________________________________________________________________        [facility] Gastroenterology    ASSESSMENT and PLAN:       Merle Dickerson is a 63 y.o. female with a significant past medical history of HTN, ITP, DM2, brain tumor (meningioma), HLD, obesity (BMI 45), and OA who presents for consultation requested by her oncologist (Annette Bal DO) for the evaluation of diarrhea.       Diarrhea  Persistent symptoms for several months without alarm/red flag features. Unclear etiology, but would suspect IBS (possibly post-infectious IBS). Her PCP was reportedly concerned for ulcerative colitis. While patients with UC will certainly have diarrhea, I do not see clinical features that would be concerning for UC in this patient. There has also been concern for medication side effects. Promacta has diarrhea listed as a side effect (9% of patients), but that seems unlikely as she has been on this medication significantly longer than she has had symptoms. Will check labs to evaluate for other causes of symptoms. Discussed colonoscopy (she is due for screening and would be helpful to evaluate diarrhea), but she is not interested in that.  - labs ordered  - PRN Imodium for symptoms      Follow up in 3 months.        Rah Dykes MD        Gastroenterology    [facility] [place] [facility]    Clinical Assistant  "Professor  [facility]        Subjective  HISTORY OF PRESENT ILLNESS:     Chief Complaint  Diarrhea    History Of Present Illness:    Merle Dickerson is a 63 y.o. female with a significant past medical history of HTN, ITP, DM2, brain tumor (meningioma), HLD, obesity (BMI 45), and OA who presents for consultation requested by her oncologist (Annette Bal DO) for the evaluation of diarrhea.    She follows with Quinn Singleton DO as her PCP.     She was seen by her PCP in May and reported diarrhea. She was then seen by oncology (Dr. Bal) who reported that her diarrhea was less likely related to Promacta. Labs have shown normal Hgb, normal platelets, and normal CMP. TSH was low in 2021. No endoscopy reports in the EMR.    Today she says that in April she had an ear infection and she was given drops for her ear. She says that after that she developed diarrhea. She thought that it was because of the ear drops initially, but symptoms have continued. She subsequently saw her PCP and she says that her PCP suspected ulcerative colitis and also thought that Promacta might be contributing. She has been on Promacta for 2 years without difficulty and when she saw her oncologist it was thought that side effect from Promacta was less likely.    Since April she says that she has been having 4 BMs per day which are watery. No blood in her stool. BMs are exclusively in the morning and she denies any nocturnal BMs. She has tried Imodium which works. She has not noticed any triggers for her symptoms, but BMs are in the morning after she eats. She has tried eliminating dairy and she also says that she has tried eliminating \"everything\", but symptoms did not change. She has abdominal pain that is around the umbilicus and a dull and full/bloated feeling. This resolves with a BM. She says multiple times that she is very sure that she does not want a colonoscopy.      Patient denies any heartburn/GERD, " N/V, dysphagia, odynophagia, constipation, hematemesis, hematochezia, melena, or weight loss.      Review of systems:   Review of Systems   Constitutional:  Negative for chills, fatigue, fever and unexpected weight change.   HENT:  Negative for congestion, sneezing, sore throat, trouble swallowing and voice change.    Respiratory:  Negative for cough, shortness of breath and wheezing.    Cardiovascular:  Negative for chest pain, palpitations and leg swelling.   Gastrointestinal:         As detailed above.   Genitourinary:  Negative for dysuria and hematuria.   Musculoskeletal:  Negative for arthralgias and myalgias.   Skin:  Negative for pallor and rash.   Neurological:  Negative for dizziness, seizures, syncope, weakness, numbness and headaches.   Hematological:  Negative for adenopathy. Does not bruise/bleed easily.   Psychiatric/Behavioral:  Negative for confusion. The patient is not nervous/anxious.    All other systems reviewed and are negative.      I performed a complete 10 point review of systems and it is negative except as noted in HPI or above.      PAST HISTORIES:       Past Medical History:  Patient Active Problem List   Diagnosis   • Back strain   • Brain tumor (Multi)   • Mass of brain   • Meningioma (Multi)   • Cellulitis of face   • Vertigo   • Type 2 diabetes mellitus (Multi)   • Epistaxis   • Left knee pain   • Hypertension   • Hyperlipidemia   • Overactive bladder   • Morbid obesity (Multi)   • Sinusitis   • Primary osteoarthritis of right knee   • Primary osteoarthritis of left knee   • OA (osteoarthritis) of knee   • Pain   • Thrombocytopenia, idiopathic (Multi)   • Subscapular bursitis   • Muscle pain     She has a past medical history of Other specified health status (05/10/2021).    Past Surgical History:  She has a past surgical history that includes Other surgical history (05/10/2021); Other surgical history (05/10/2021); Other surgical history (03/29/2021); Other surgical history  "(05/10/2021); CT angio head w and wo IV contrast (1/13/2021); CT angio neck (1/13/2021); MR angio head wo IV contrast (2/10/2021); and MR angio neck wo IV contrast (2/10/2021).      Social History:  She reports that she has never smoked. She has never used smokeless tobacco. She reports that she does not drink alcohol and does not use drugs.    Family History:  No known family history of GI disease, specifically denies any family history of pancreatitis, Crohn's, colon cancer, gastroesophageal cancer, or ulcerative colitis.    No family history on file.     Allergies:  Clindamycin, Sulfamethoxazole-trimethoprim, and Amoxicillin      Objective  OBJECTIVE:       Last Recorded Vitals:  Vitals:    07/10/24 1531   BP: 125/86   Pulse: 82   SpO2: 93%   Weight: 130 kg (286 lb)   Height: 1.715 m (5' 7.5\")     /86   Pulse 82   Ht 1.715 m (5' 7.5\")   Wt 130 kg (286 lb)   SpO2 93%   BMI 44.13 kg/m²      Physical Exam:    Physical Exam  Vitals reviewed.   Constitutional:       General: She is not in acute distress.     Appearance: She is obese. She is not ill-appearing.   HENT:      Head: Normocephalic and atraumatic.   Eyes:      General: No scleral icterus.  Cardiovascular:      Rate and Rhythm: Normal rate and regular rhythm.      Pulses: Normal pulses.      Heart sounds: Normal heart sounds. No murmur heard.  Pulmonary:      Effort: Pulmonary effort is normal. No respiratory distress.      Breath sounds: Normal breath sounds. No wheezing.   Abdominal:      General: Bowel sounds are normal.      Palpations: Abdomen is soft.      Tenderness: There is no abdominal tenderness. There is no rebound.   Musculoskeletal:         General: No swelling or deformity.   Skin:     General: Skin is warm and dry.      Coloration: Skin is not jaundiced.      Findings: No rash.   Neurological:      General: No focal deficit present.      Mental Status: She is alert and oriented to person, place, and time.   Psychiatric:         Mood " "and Affect: Mood normal.         Behavior: Behavior normal.         Thought Content: Thought content normal.         Judgment: Judgment normal.         Home Medications:  Prior to Admission medications    Medication Sig Start Date End Date Taking? Authorizing Provider   blood sugar diagnostic (Blood Glucose Test) strip 2 times a day. 11/12/21   Historical Provider, MD   eltrombopag (Promacta) 75 mg tablet TAKE ONE (1) TABLET BY MOUTH ONCE DAILY ON AN EMPTY STOMACH. TAKE 1 HOUR BEFORE OR 2 HOURS AFTER A MEAL. 9/21/23 9/20/24  Annette Bal MD   eltrombopag (Promacta) 75 mg tablet Take one (1) tablet by mouth once daily on an empty stomach. Take 1 hour before or 2 hours after a meal. Orally Once a day 30 days 1/8/24      insulin glargine (Lantus Solostar U-100 Insulin) 100 unit/mL (3 mL) pen Inject 42 Units under the skin once daily at bedtime. 5/15/24 8/13/24  Quinn Singleton,    lovastatin (Mevacor) 40 mg tablet Take 1 tablet (40 mg) by mouth once daily. 5/15/24 8/13/24  Quinn Singleton,    metFORMIN (Glucophage) 500 mg tablet Take 1 tablet (500 mg) by mouth 2 times a day. Appointment needed for refills 5/15/24 8/13/24  Quinn Singleton DO   metoprolol tartrate (Lopressor) 50 mg tablet Take 1 tablet by mouth 2 times a day. 5/15/24 8/13/24  Quinn Singleton DO   pen needle, diabetic (TRUEplus Pen Needle) 31 gauge x 5/16\" needle Use once daily as directed 3/20/24   Quinn Singleton DO         Relevant Results Recent labs reviewed in the EMR.    Lab Results   Component Value Date/Time    WBC 8.8 05/23/2024 1238    HGB 13.0 05/23/2024 1238    HGB 13.0 03/01/2024 1518    HGB 13.2 12/04/2023 1331    MCV 81 05/23/2024 1238     05/23/2024 1238     03/01/2024 1518     12/04/2023 1331    IRON 61 02/09/2021 0049    TIBC 328 02/09/2021 0049    IRONSAT 19 (L) 02/09/2021 0049    FERRITIN 206 (H) 02/09/2021 0049    [id number] 439 02/09/2021 0049    FOLATE 9.7 02/09/2021 0049       Lab " "Results   Component Value Date/Time     05/15/2024 1341    K 4.3 05/15/2024 1341     05/15/2024 1341    BUN 15 05/15/2024 1341    CREATININE 0.65 05/15/2024 1341    CREATININE 0.69 02/15/2024 1550       Lab Results   Component Value Date/Time    BILITOT 0.7 05/15/2024 1341    BILITOT 1.0 02/15/2024 1550    BILITOT 1.0 10/10/2023 1439    BILIDIR 0.2 02/09/2021 0049    ALKPHOS 76 05/15/2024 1341    ALKPHOS 99 02/15/2024 1550    ALKPHOS 97 10/10/2023 1439    AST 26 05/15/2024 1341    AST 11 02/15/2024 1550    AST 11 10/10/2023 1439    ALT 16 05/15/2024 1341    ALT 10 02/15/2024 1550    ALT 10 10/10/2023 1439       No results found for: \"CRP\", \"CALPS\"    Radiology: Imaging reviewed in the EMR.  No results found.    === 01/16/21 ===    - Impression -  No acute abnormality of the chest, abdomen and pelvis.    Specifically, no evidence of primary malignancy or metastatic disease.      "

## 2024-07-11 ASSESSMENT — ENCOUNTER SYMPTOMS
DIZZINESS: 0
FATIGUE: 0
ADENOPATHY: 0
FEVER: 0
CONFUSION: 0
HEADACHES: 0
WHEEZING: 0
COUGH: 0
VOICE CHANGE: 0
NUMBNESS: 0
SEIZURES: 0
ARTHRALGIAS: 0
ROS GI COMMENTS: AS DETAILED ABOVE.
DYSURIA: 0
MYALGIAS: 0
UNEXPECTED WEIGHT CHANGE: 0
HEMATURIA: 0
TROUBLE SWALLOWING: 0
CHILLS: 0
BRUISES/BLEEDS EASILY: 0
SHORTNESS OF BREATH: 0
PALPITATIONS: 0
NERVOUS/ANXIOUS: 0
WEAKNESS: 0
SORE THROAT: 0